# Patient Record
Sex: MALE | Race: WHITE | NOT HISPANIC OR LATINO | Employment: UNEMPLOYED | URBAN - NONMETROPOLITAN AREA
[De-identification: names, ages, dates, MRNs, and addresses within clinical notes are randomized per-mention and may not be internally consistent; named-entity substitution may affect disease eponyms.]

---

## 2017-08-02 ENCOUNTER — TRANSCRIBE ORDERS (OUTPATIENT)
Dept: CARDIOLOGY | Facility: CLINIC | Age: 72
End: 2017-08-02

## 2017-08-02 DIAGNOSIS — I49.9 CARDIAC ARRHYTHMIA, UNSPECIFIED CARDIAC ARRHYTHMIA TYPE: Primary | ICD-10-CM

## 2017-08-10 ENCOUNTER — CONSULT (OUTPATIENT)
Dept: CARDIOLOGY | Facility: CLINIC | Age: 72
End: 2017-08-10

## 2017-08-10 VITALS
BODY MASS INDEX: 26.81 KG/M2 | HEART RATE: 64 BPM | DIASTOLIC BLOOD PRESSURE: 68 MMHG | WEIGHT: 181 LBS | SYSTOLIC BLOOD PRESSURE: 150 MMHG | HEIGHT: 69 IN

## 2017-08-10 DIAGNOSIS — E78.00 HYPERCHOLESTEREMIA: ICD-10-CM

## 2017-08-10 DIAGNOSIS — I10 ESSENTIAL HYPERTENSION: Primary | ICD-10-CM

## 2017-08-10 DIAGNOSIS — R06.02 SHORTNESS OF BREATH: ICD-10-CM

## 2017-08-10 DIAGNOSIS — R01.1 MURMUR, CARDIAC: ICD-10-CM

## 2017-08-10 DIAGNOSIS — R07.9 CHEST PAIN, UNSPECIFIED TYPE: ICD-10-CM

## 2017-08-10 DIAGNOSIS — I49.1 PAC (PREMATURE ATRIAL CONTRACTION): ICD-10-CM

## 2017-08-10 DIAGNOSIS — R00.2 PALPITATIONS: ICD-10-CM

## 2017-08-10 PROCEDURE — 99204 OFFICE O/P NEW MOD 45 MIN: CPT | Performed by: INTERNAL MEDICINE

## 2017-08-10 PROCEDURE — 93000 ELECTROCARDIOGRAM COMPLETE: CPT | Performed by: INTERNAL MEDICINE

## 2017-08-10 RX ORDER — LEVOTHYROXINE SODIUM 0.05 MG/1
50 TABLET ORAL DAILY
COMMUNITY

## 2017-08-10 RX ORDER — COLCHICINE 0.6 MG/1
0.6 TABLET ORAL DAILY
COMMUNITY
End: 2020-01-01 | Stop reason: ALTCHOICE

## 2017-08-10 RX ORDER — CIPROFLOXACIN 500 MG/1
500 TABLET, FILM COATED ORAL 2 TIMES DAILY
COMMUNITY
End: 2017-11-14 | Stop reason: ALTCHOICE

## 2017-08-10 RX ORDER — ATORVASTATIN CALCIUM 10 MG/1
10 TABLET, FILM COATED ORAL NIGHTLY
COMMUNITY

## 2017-08-10 RX ORDER — ASPIRIN 81 MG/1
81 TABLET, CHEWABLE ORAL DAILY
COMMUNITY
End: 2017-11-14 | Stop reason: ALTCHOICE

## 2017-08-10 RX ORDER — PROPRANOLOL HYDROCHLORIDE 120 MG/1
120 CAPSULE, EXTENDED RELEASE ORAL DAILY
COMMUNITY

## 2017-08-10 NOTE — PROGRESS NOTES
CARDIAC COMPLAINTS  chest pressure/discomfort, dyspnea, fatigue and palpitations      Subjective   Salo Moctezuma is a 72 y.o. male came in today for his initial cardiac evaluation.  He has history of mild hypertension, hypercholesterolemia, hypothyroidism who has been having episodes of palpitation in the past.  He was put on long-acting beta blockers for a long time.  Recently he started noticing the palpitation is getting little bit more frequent.  When he was seen at your office, the EKG showed sinus rhythm with a few PAC's.  He is now referred for further evaluation.  He also has been having left-sided chest pain, which is mostly in the inframammary area.  It occurs anytime of the day, not related to any palpitation.  It is not precipitated or relieved by any activities.  It lasts only for a few seconds.  He also has been having shortness of breath which occurs mostly with activities for some time at rest also.  His lab work was done at your office recently, and he thinks that it was within normal limit.    History reviewed. No pertinent surgical history.    Current Outpatient Prescriptions   Medication Sig Dispense Refill   • aspirin 81 MG chewable tablet Chew 81 mg Daily.     • atorvastatin (LIPITOR) 10 MG tablet Take 10 mg by mouth Every Night.     • ciprofloxacin (CIPRO) 500 MG tablet Take 500 mg by mouth 2 (Two) Times a Day. For ten days     • colchicine 0.6 MG tablet Take 0.6 mg by mouth Daily.     • levothyroxine (SYNTHROID, LEVOTHROID) 50 MCG tablet Take 50 mcg by mouth Daily.     • propranolol LA (INDERAL LA) 120 MG 24 hr capsule Take 120 mg by mouth Daily.       No current facility-administered medications for this visit.            ALLERGIES:  Sulfa antibiotics    Past Medical History:   Diagnosis Date   • History of cholecystectomy    • Hyperlipidemia    • Hypertension    • Hypothyroidism    • Mitral valve prolapse        History   Smoking Status   • Never Smoker   Smokeless Tobacco   • Current  "User   • Types: Chew          Family History   Problem Relation Age of Onset   • Heart attack Father    • Heart disease Brother        Review of Systems   Constitution: Positive for malaise/fatigue. Negative for decreased appetite.   HENT: Negative for congestion and sore throat.    Eyes: Negative for blurred vision.   Cardiovascular: Positive for chest pain, dyspnea on exertion, irregular heartbeat and palpitations.   Respiratory: Positive for shortness of breath. Negative for snoring.    Endocrine: Negative for cold intolerance and heat intolerance.   Hematologic/Lymphatic: Negative for adenopathy. Does not bruise/bleed easily.   Skin: Negative for itching, nail changes and skin cancer.   Musculoskeletal: Positive for arthritis. Negative for myalgias.   Gastrointestinal: Negative for abdominal pain, dysphagia and heartburn.   Genitourinary: Negative for bladder incontinence and frequency.   Neurological: Negative for dizziness, light-headedness, seizures and vertigo.   Psychiatric/Behavioral: Negative for altered mental status.   Allergic/Immunologic: Negative for environmental allergies and hives.       Diabetes- No  Thyroid- abnormal    Objective     /68 (BP Location: Right arm)  Pulse 64  Ht 69\" (175.3 cm)  Wt 181 lb (82.1 kg)  BMI 26.73 kg/m2    Physical Exam   Constitutional: He is oriented to person, place, and time. He appears well-nourished.   HENT:   Head: Normocephalic.   Eyes: Pupils are equal, round, and reactive to light.   Neck: Normal range of motion.   Cardiovascular: Normal rate, regular rhythm, S1 normal and S2 normal.    Murmur heard.  Pulmonary/Chest: Breath sounds normal.   Abdominal: Soft. Bowel sounds are normal.   Musculoskeletal: Normal range of motion.   Neurological: He is alert and oriented to person, place, and time.   Skin: Skin is warm.   Psychiatric: He has a normal mood and affect.         ECG 12 Lead  Date/Time: 8/10/2017 12:05 PM  Performed by: OLVIN" COLBY  Authorized by: COLBY VILCHIS   Comparison: compared with previous ECG from 8/1/2017  Comparison to previous ECG: No PAC's  Rhythm: sinus rhythm  Rate: normal  QRS axis: normal  Clinical impression: non-specific ECG              Assessment/Plan     Salo was seen today for establish care, shortness of breath and palpitations.    Diagnoses and all orders for this visit:    Essential hypertension    Hypercholesteremia  -     Stress Test With Myocardial Perfusion One Day; Future    Palpitations  -     Cardiac Event Monitor; Future  -     ECG 12 Lead    Chest pain, unspecified type  -     Stress Test With Myocardial Perfusion One Day; Future  -     ECG 12 Lead    Shortness of breath  -     Adult Transthoracic Echo Complete; Future    Murmur, cardiac  -     Adult Transthoracic Echo Complete; Future    PAC (premature atrial contraction)  -     Cardiac Event Monitor; Future     His heart rate is stable and regular.  His blood pressure is mildly elevated.  His EKG showed sinus rhythm with normal OK interval and QTC.  He had some nonspecific ST changes.  His clinical examination shows evidence of mild mitral regurgitation.  BMI is upper limit of normal.  I explained to him about the EKG which was done at your office.  At this time, he is advised to talk you regarding the lab work.  If his renal function is normal, suggest a low dose of ace inhibitors or ARB.  He is also advised to talk you regarding his TSH level.  At this time I am going to put an extended monitor to evaluate the arrhythmia.  I scheduled him to undergo an echocardiogram to evaluate the LV function and the PA pressure.  He also need to undergo a stress test to evaluate any stress-induced arrhythmia and also to rule out stress-induced ischemia.  Based on the results of these tests, further recommendations will be made.               Electronically signed by Colby Vilchis MD August 10, 2017 12:06 PM

## 2017-08-16 ENCOUNTER — TELEPHONE (OUTPATIENT)
Dept: CARDIOLOGY | Facility: CLINIC | Age: 72
End: 2017-08-16

## 2017-08-16 DIAGNOSIS — R07.89 OTHER CHEST PAIN: Primary | ICD-10-CM

## 2017-08-16 NOTE — TELEPHONE ENCOUNTER
[employer] denied nuclear stress, wants changed to treadmill. If that's okay with you, will you put in the order? Thanks

## 2017-08-23 ENCOUNTER — OUTSIDE FACILITY SERVICE (OUTPATIENT)
Dept: CARDIOLOGY | Facility: CLINIC | Age: 72
End: 2017-08-23

## 2017-08-23 ENCOUNTER — HOSPITAL ENCOUNTER (OUTPATIENT)
Dept: CARDIOLOGY | Facility: HOSPITAL | Age: 72
Discharge: HOME | End: 2017-08-23
Attending: INTERNAL MEDICINE | Admitting: INTERNAL MEDICINE

## 2017-08-23 ENCOUNTER — HOSPITAL ENCOUNTER (OUTPATIENT)
Dept: CARDIOLOGY | Facility: HOSPITAL | Age: 72
Discharge: HOME | End: 2017-08-23
Attending: INTERNAL MEDICINE

## 2017-08-23 DIAGNOSIS — R07.89 OTHER CHEST PAIN: ICD-10-CM

## 2017-08-23 DIAGNOSIS — R06.02 SHORTNESS OF BREATH: ICD-10-CM

## 2017-08-23 DIAGNOSIS — R01.1 MURMUR, CARDIAC: ICD-10-CM

## 2017-08-23 LAB
MAXIMAL PREDICTED HEART RATE: 148 BPM
STRESS TARGET HR: 126 BPM

## 2017-08-23 PROCEDURE — 93306 TTE W/DOPPLER COMPLETE: CPT | Performed by: INTERNAL MEDICINE

## 2017-08-23 PROCEDURE — 93306 TTE W/DOPPLER COMPLETE: CPT

## 2017-08-23 PROCEDURE — 93018 CV STRESS TEST I&R ONLY: CPT | Performed by: INTERNAL MEDICINE

## 2017-08-23 PROCEDURE — 93017 CV STRESS TEST TRACING ONLY: CPT

## 2017-08-28 ENCOUNTER — TELEPHONE (OUTPATIENT)
Dept: CARDIOLOGY | Facility: CLINIC | Age: 72
End: 2017-08-28

## 2017-08-28 DIAGNOSIS — R07.89 OTHER CHEST PAIN: Primary | ICD-10-CM

## 2017-08-28 NOTE — TELEPHONE ENCOUNTER
I spoke with patient's wife, Eunice Moctezuma.  Aware of regular stress test results and recommendations.  (Inconclusive since patient failed to achieve THR)  6 minutes, 76% THR    Patient is willing to proceed with nuclear stress.  Can you please put in order?

## 2017-09-27 ENCOUNTER — OUTSIDE FACILITY SERVICE (OUTPATIENT)
Dept: CARDIOLOGY | Facility: CLINIC | Age: 72
End: 2017-09-27

## 2017-09-27 PROCEDURE — 93228 REMOTE 30 DAY ECG REV/REPORT: CPT | Performed by: INTERNAL MEDICINE

## 2017-11-14 ENCOUNTER — OFFICE VISIT (OUTPATIENT)
Dept: CARDIOLOGY | Facility: CLINIC | Age: 72
End: 2017-11-14

## 2017-11-14 VITALS
SYSTOLIC BLOOD PRESSURE: 124 MMHG | DIASTOLIC BLOOD PRESSURE: 80 MMHG | HEART RATE: 72 BPM | BODY MASS INDEX: 27.99 KG/M2 | HEIGHT: 69 IN | WEIGHT: 189 LBS

## 2017-11-14 DIAGNOSIS — R00.2 PALPITATIONS: ICD-10-CM

## 2017-11-14 DIAGNOSIS — E78.00 HYPERCHOLESTEREMIA: ICD-10-CM

## 2017-11-14 DIAGNOSIS — I10 ESSENTIAL HYPERTENSION: ICD-10-CM

## 2017-11-14 DIAGNOSIS — R53.83 OTHER FATIGUE: ICD-10-CM

## 2017-11-14 DIAGNOSIS — I49.1 PAC (PREMATURE ATRIAL CONTRACTION): ICD-10-CM

## 2017-11-14 DIAGNOSIS — R51.9 MORNING HEADACHE: ICD-10-CM

## 2017-11-14 DIAGNOSIS — R06.02 SHORTNESS OF BREATH: ICD-10-CM

## 2017-11-14 DIAGNOSIS — R07.89 OTHER CHEST PAIN: ICD-10-CM

## 2017-11-14 DIAGNOSIS — G47.8 AWAKENS FROM SLEEP AT NIGHT: Primary | ICD-10-CM

## 2017-11-14 DIAGNOSIS — Z72.0 CHEWING TOBACCO USE: ICD-10-CM

## 2017-11-14 PROCEDURE — 99214 OFFICE O/P EST MOD 30 MIN: CPT | Performed by: NURSE PRACTITIONER

## 2017-11-14 NOTE — PROGRESS NOTES
Chief Complaint   Patient presents with   • Follow-up     For cardiac management. Reports that he does occasionally have chest pains near the side of his chest. Reports he does occasionally have palpitations. Reports shortness of breath, but nothing worse than usual. Would like to go over holter monitor results that are in chart. Last lab work done about 3 months per PCP, not in chart.    • Med Refill     PCP does medication refills.        Subjective       Salo Moctezuma is a 72 y.o. male with a history of mild hypertension, hypercholesterolemia, hypothyroidism and episodes of palpitation in the past.  He was put on long-acting beta blockers many years ago. In August 2017, he was referred to the office due to palpitations becoming more frequent. EKG showed sinus rhythm with a few PAC's.  He also reported left sided chest pain, lasts only for a few seconds without associated symptoms and occurring randomly. A regular stress test was done but inconclusive due to not being able to reach target heart rate. PACs were noted. Echocardiogram was suggestive of diastolic dysfunction with normal ejection fraction and increased PA pressure. A cardiac monitor was then worn and baseline is sinus. PACs noted, no significant arrhythmia.   Today he comes to the office for a follow up visit. He continues to have same symptoms as noted. He also admits to symptoms suggestive of nocturnal hypoxia.     HPI         Cardiac History:    Past Surgical History:   Procedure Laterality Date   • ECHO - CONVERTED  08/23/2017    EF 60-65%, mild MR, RVSP 41 mmHg   • EXERCISE STRESS - CONVERTED  08/23/2017    6 min, 76% THR, 172/75, frequent PACs, inconclusive    • OTHER SURGICAL HISTORY  09/10/2017    baseline sinus, PACs noted, brief atrial run, lowest HR 50 highest 112, no V-tach, no pauses       Current Outpatient Prescriptions   Medication Sig Dispense Refill   • atorvastatin (LIPITOR) 10 MG tablet Take 10 mg by mouth Every Night.     •  colchicine 0.6 MG tablet Take 0.6 mg by mouth Daily.     • levothyroxine (SYNTHROID, LEVOTHROID) 50 MCG tablet Take 50 mcg by mouth Daily.     • propranolol LA (INDERAL LA) 120 MG 24 hr capsule Take 120 mg by mouth Daily.       No current facility-administered medications for this visit.        Sulfa antibiotics    Past Medical History:   Diagnosis Date   • History of cholecystectomy    • Hyperlipidemia    • Hypertension    • Hypothyroidism    • Mitral valve prolapse        Social History     Social History   • Marital status:      Spouse name: N/A   • Number of children: N/A   • Years of education: N/A     Occupational History   • Not on file.     Social History Main Topics   • Smoking status: Never Smoker   • Smokeless tobacco: Current User     Types: Chew   • Alcohol use Yes      Comment: used to drink beer and wine but no in the past 2 years   • Drug use: No   • Sexual activity: Not on file     Other Topics Concern   • Not on file     Social History Narrative       Family History   Problem Relation Age of Onset   • Heart attack Father    • Heart disease Brother        Review of Systems   Constitution: Negative for decreased appetite and malaise/fatigue.   HENT: Negative for congestion and sore throat.    Eyes: Negative for blurred vision.   Cardiovascular: Positive for chest pain, irregular heartbeat and palpitations (flutters). Negative for leg swelling.   Respiratory: Positive for shortness of breath and sleep disturbances due to breathing.    Endocrine: Negative for cold intolerance and heat intolerance.   Hematologic/Lymphatic: Negative for bleeding problem. Does not bruise/bleed easily.   Skin: Negative for itching and nail changes.   Musculoskeletal: Positive for arthritis. Negative for falls and myalgias.   Gastrointestinal: Positive for nausea (sometimes after meal). Negative for abdominal pain, dysphagia, heartburn and melena.   Genitourinary: Negative for dysuria, frequency and hematuria.  "  Neurological: Positive for excessive daytime sleepiness and headaches. Negative for dizziness, light-headedness, seizures and vertigo.   Psychiatric/Behavioral: Negative for altered mental status. The patient has insomnia (wake up often). The patient is not nervous/anxious.    Allergic/Immunologic: Negative for environmental allergies and hives.      Diabetes- No  Thyroid-normal    Objective     /80 (BP Location: Right arm)  Pulse 72  Ht 69\" (175.3 cm)  Wt 189 lb (85.7 kg)  BMI 27.91 kg/m2    Physical Exam   Constitutional: He is oriented to person, place, and time. He appears well-nourished.   HENT:   Head: Normocephalic.   Eyes: Conjunctivae are normal. Pupils are equal, round, and reactive to light.   Neck: Normal range of motion. Neck supple. No JVD present. Carotid bruit is not present.   Cardiovascular: Normal rate, regular rhythm, S1 normal, S2 normal and normal pulses.    No murmur heard.  Pulmonary/Chest: Effort normal and breath sounds normal. He has no wheezes. He has no rales.   Abdominal: Soft. Bowel sounds are normal. He exhibits no distension. There is no tenderness.   Musculoskeletal: Normal range of motion. He exhibits no edema.   Neurological: He is alert and oriented to person, place, and time.   Skin: Skin is warm and dry.   Psychiatric: He has a normal mood and affect. His behavior is normal. Judgment and thought content normal.    Procedures        Assessment/Plan      Salo was seen today for follow-up and med refill.    Diagnoses and all orders for this visit:    Awakens from sleep at night  -     Overnight Sleep Oximetry Study; Future    Morning headache  -     Overnight Sleep Oximetry Study; Future    Essential hypertension  -     Overnight Sleep Oximetry Study; Future  -     Stress Test With Myocardial Perfusion One Day; Future    Hypercholesteremia  -     Stress Test With Myocardial Perfusion One Day; Future    PAC (premature atrial contraction)    Palpitations  -     Stress " Test With Myocardial Perfusion One Day; Future    Other fatigue  -     Overnight Sleep Oximetry Study; Future  -     Stress Test With Myocardial Perfusion One Day; Future    Other chest pain  -     Stress Test With Myocardial Perfusion One Day; Future    Shortness of breath  -     Overnight Sleep Oximetry Study; Future  -     Stress Test With Myocardial Perfusion One Day; Future    Chewing tobacco use      The reports of his recent regular stress, echo and cardiac monitor were reviewed. Stress was inconclusive due to not reaching THR. Due to persistent symptoms and PACs a nuclear stress test ordered. He also has symptoms suggestive of nocturnal hypoxia. An overnight oxygen monitor ordered. No medication changes made at this time. HIs BMI is 28. I encouraged him on cardiac diet and weight loss for goal BMI of 25. I also encouraged him to decrease use of chewing tobacco with goal of cessation. He follows with you for management of labs. Further recommendations based on test results.              Electronically signed by JULIAN Acosta,  November 14, 2017 4:09 PM

## 2017-11-17 ENCOUNTER — TELEPHONE (OUTPATIENT)
Dept: CARDIOLOGY | Facility: CLINIC | Age: 72
End: 2017-11-17

## 2017-11-17 NOTE — TELEPHONE ENCOUNTER
[employer] denied nuclear stress. Wants changed to regular treadmill. If that's okay with you, will you put in the order? Thanks!

## 2017-11-20 NOTE — TELEPHONE ENCOUNTER
I spoke with [employer] and tried to do appeal.  Peer to Peer has be JULIAN or MD.    I scheduled peer to peer for today 11/20/17 at 4:15 pm.  Medical review doctor will be calling for JULIAN Garcia.

## 2017-11-20 NOTE — TELEPHONE ENCOUNTER
He just done regular stress that was inconclusive due to inability to walk treadmill long enough to reach target heart rate. He continues to have palpitations. Need to evaluate for ischemic cause of symptoms. Thanks.

## 2017-11-21 NOTE — TELEPHONE ENCOUNTER
[facility] never called to do peer to peer yesterday.    I rescheduled to 11/22/17 11:45 am.  No afternoon slots were available.

## 2017-11-30 ENCOUNTER — HOSPITAL ENCOUNTER (OUTPATIENT)
Dept: CARDIOLOGY | Facility: HOSPITAL | Age: 72
Discharge: HOME | End: 2017-11-30

## 2017-11-30 ENCOUNTER — OUTSIDE FACILITY SERVICE (OUTPATIENT)
Dept: CARDIOLOGY | Facility: CLINIC | Age: 72
End: 2017-11-30

## 2017-11-30 DIAGNOSIS — R00.2 PALPITATIONS: ICD-10-CM

## 2017-11-30 DIAGNOSIS — E78.00 HYPERCHOLESTEREMIA: ICD-10-CM

## 2017-11-30 DIAGNOSIS — R53.83 OTHER FATIGUE: ICD-10-CM

## 2017-11-30 DIAGNOSIS — I10 ESSENTIAL HYPERTENSION: ICD-10-CM

## 2017-11-30 DIAGNOSIS — R07.89 OTHER CHEST PAIN: ICD-10-CM

## 2017-11-30 DIAGNOSIS — R06.02 SHORTNESS OF BREATH: ICD-10-CM

## 2017-11-30 LAB
MAXIMAL PREDICTED HEART RATE: 148 BPM
STRESS TARGET HR: 126 BPM

## 2017-11-30 PROCEDURE — 78452 HT MUSCLE IMAGE SPECT MULT: CPT

## 2017-11-30 PROCEDURE — 93017 CV STRESS TEST TRACING ONLY: CPT

## 2017-11-30 PROCEDURE — 78452 HT MUSCLE IMAGE SPECT MULT: CPT | Performed by: INTERNAL MEDICINE

## 2017-11-30 PROCEDURE — 93018 CV STRESS TEST I&R ONLY: CPT | Performed by: INTERNAL MEDICINE

## 2017-11-30 PROCEDURE — A9500 TC99M SESTAMIBI: HCPCS | Performed by: INTERNAL MEDICINE

## 2017-11-30 PROCEDURE — 25010000002 REGADENOSON 0.4 MG/5ML SOLUTION: Performed by: INTERNAL MEDICINE

## 2017-11-30 PROCEDURE — 0 TECHNETIUM SESTAMIBI: Performed by: INTERNAL MEDICINE

## 2017-11-30 RX ADMIN — TECHNETIUM TC-99M SESTAMIBI 1 DOSE: 1 INJECTION INTRAVENOUS at 13:15

## 2017-11-30 RX ADMIN — REGADENOSON 0.4 MG: 0.08 INJECTION, SOLUTION INTRAVENOUS at 13:15

## 2017-12-04 ENCOUNTER — TELEPHONE (OUTPATIENT)
Dept: CARDIOLOGY | Facility: CLINIC | Age: 72
End: 2017-12-04

## 2017-12-04 RX ORDER — ISOSORBIDE MONONITRATE 30 MG/1
30 TABLET, EXTENDED RELEASE ORAL DAILY
Qty: 30 TABLET | Refills: 7 | Status: SHIPPED | OUTPATIENT
Start: 2017-12-04 | End: 2018-05-15 | Stop reason: SDUPTHER

## 2017-12-04 NOTE — TELEPHONE ENCOUNTER
Patient aware of stress test results and recommendations.  Add Imdur 30 mg once a day.  Patient aware if symptoms persist to call the office.

## 2018-05-15 ENCOUNTER — OFFICE VISIT (OUTPATIENT)
Dept: CARDIOLOGY | Facility: CLINIC | Age: 73
End: 2018-05-15

## 2018-05-15 VITALS
HEART RATE: 72 BPM | WEIGHT: 191 LBS | HEIGHT: 69 IN | SYSTOLIC BLOOD PRESSURE: 120 MMHG | DIASTOLIC BLOOD PRESSURE: 80 MMHG | BODY MASS INDEX: 28.29 KG/M2

## 2018-05-15 DIAGNOSIS — E78.00 HYPERCHOLESTEREMIA: ICD-10-CM

## 2018-05-15 DIAGNOSIS — I10 ESSENTIAL HYPERTENSION: ICD-10-CM

## 2018-05-15 DIAGNOSIS — R00.2 PALPITATIONS: Primary | ICD-10-CM

## 2018-05-15 DIAGNOSIS — R07.89 OTHER CHEST PAIN: ICD-10-CM

## 2018-05-15 DIAGNOSIS — E66.3 OVERWEIGHT: ICD-10-CM

## 2018-05-15 DIAGNOSIS — R06.02 SHORTNESS OF BREATH: ICD-10-CM

## 2018-05-15 PROCEDURE — 99213 OFFICE O/P EST LOW 20 MIN: CPT | Performed by: NURSE PRACTITIONER

## 2018-05-15 RX ORDER — ISOSORBIDE MONONITRATE 30 MG/1
30 TABLET, EXTENDED RELEASE ORAL DAILY
Qty: 30 TABLET | Refills: 11 | Status: SHIPPED | OUTPATIENT
Start: 2018-05-15 | End: 2019-01-01 | Stop reason: SDUPTHER

## 2018-05-15 NOTE — PATIENT INSTRUCTIONS
Coronary Angiogram  A coronary angiogram is an X-ray procedure that is used to examine the arteries in the heart. In this procedure, a dye (contrast dye) is injected through a long, thin tube (catheter). The catheter is inserted through the groin, wrist, or arm. The dye is injected into each artery, then X-rays are taken to show if there is a blockage in the arteries of the heart. This procedure can also show if you have valve disease or a disease of the aorta, and it can be used to check the overall function of your heart muscle. You may have a coronary angiogram if:  · You are having chest pain, or other symptoms of angina, and you are at risk for heart disease.  · You have an abnormal electrocardiogram (ECG) or stress test.  · You have chest pain and heart failure.  · You are having irregular heart rhythms.  · You and your health care provider determine that the benefits of the test information outweigh the risks of the procedure.  Let your health care provider know about:  · Any allergies you have, including allergies to contrast dye.  · All medicines you are taking, including vitamins, herbs, eye drops, creams, and over-the-counter medicines.  · Any problems you or family members have had with anesthetic medicines.  · Any blood disorders you have.  · Any surgeries you have had.  · History of kidney problems or kidney failure.  · Any medical conditions you have.  · Whether you are pregnant or may be pregnant.  What are the risks?  Generally, this is a safe procedure. However, problems may occur, including:  · Infection.  · Allergic reaction to medicines or dyes that are used.  · Bleeding from the access site or other locations.  · Kidney injury, especially in people with impaired kidney function.  · Stroke (rare).  · Heart attack (rare).  · Damage to other structures or organs.  What happens before the procedure?  Staying hydrated   Follow instructions from your health care provider about hydration, which may  include:  · Up to 2 hours before the procedure - you may continue to drink clear liquids, such as water, clear fruit juice, black coffee, and plain tea.  Eating and drinking restrictions   Follow instructions from your health care provider about eating and drinking, which may include:  · 8 hours before the procedure - stop eating heavy meals or foods such as meat, fried foods, or fatty foods.  · 6 hours before the procedure - stop eating light meals or foods, such as toast or cereal.  · 2 hours before the procedure - stop drinking clear liquids.  General instructions   · Ask your health care provider about:  ¨ Changing or stopping your regular medicines. This is especially important if you are taking diabetes medicines or blood thinners.  ¨ Taking medicines such as ibuprofen. These medicines can thin your blood. Do not take these medicines before your procedure if your health care provider instructs you not to, though aspirin may be recommended prior to coronary angiograms.  · Plan to have someone take you home from the hospital or clinic.  · You may need to have blood tests or X-rays done.  What happens during the procedure?  · An IV tube will be inserted into one of your veins.  · You will be given one or more of the following:  ¨ A medicine to help you relax (sedative).  ¨ A medicine to numb the area where the catheter will be inserted into an artery (local anesthetic).  · To reduce your risk of infection:  ¨ Your health care team will wash or sanitize their hands.  ¨ Your skin will be washed with soap.  ¨ Hair may be removed from the area where the catheter will be inserted.  · You will be connected to a continuous ECG monitor.  · The catheter will be inserted into an artery. The location may be in your groin, in your wrist, or in the fold of your arm (near your elbow).  · A type of X-ray (fluoroscopy) will be used to help guide the catheter to the opening of the blood vessel that is being examined.  · A dye  will be injected into the catheter, and X-rays will be taken. The dye will help to show where any narrowing or blockages are located in the heart arteries.  · Tell your health care provider if you have any chest pain or trouble breathing during the procedure.  · If blockages are found, your health care provider may perform another procedure, such as inserting a coronary stent.  The procedure may vary among health care providers and hospitals.  What happens after the procedure?  · After the procedure, you will need to keep the area still for a few hours, or for as long as told by your health care provider. If the procedure is done through the groin, you will be instructed to not bend and not cross your legs.  · The insertion site will be checked frequently.  · The pulse in your foot or wrist will be checked frequently.  · You may have additional blood tests, X-rays, and a test that records the electrical activity of your heart (ECG).  · Do not drive for 24 hours if you were given a sedative.  Summary  · A coronary angiogram is an X-ray procedure that is used to look into the arteries in the heart.  · During the procedure, a dye (contrast dye) is injected through a long, thin tube (catheter). The catheter is inserted through the groin, wrist, or arm.  · Tell your health care provider about any allergies you have, including allergies to contrast dye.  · After the procedure, you will need to keep the area still for a few hours, or for as long as told by your health care provider.  This information is not intended to replace advice given to you by your health care provider. Make sure you discuss any questions you have with your health care provider.  Document Released: 06/23/2004 Document Revised: 09/29/2017 Document Reviewed: 09/29/2017  [employer] Interactive Patient Education © 2017 [employer]

## 2018-05-15 NOTE — PROGRESS NOTES
Chief Complaint   Patient presents with   • Follow-up     For cardiac management. Reports he has been having some chest pains that are sometimes sharp. Reports he occasionally has shortness of breath. Reports he does occasionally have palpitations. Last lab work was done a couple of months ago per PCP, not in chart.    • Med Refill     Needs refills on isosorbide. 90 day supplys to [facility].        Cardiac Complaints  chest pressure/discomfort, dyspnea and palpitations      Subjective   Salo Moctezuma is a 73 y.o. male with hypertension, hypercholesterolemia, hypothyroidism, and episodes of palpitation in the past.  He was put on long-acting beta blockers many years ago. In August 2017, he was referred to the office due to palpitations which were becoming more frequent. EKG showed sinus rhythm with a few PAC's.  He also reported left sided chest pain, lasts only for a few seconds without associated symptoms and occurring randomly. A regular stress test was done but inconclusive due to not being able to reach target heart rate. PACs were noted. Echocardiogram was suggestive of diastolic dysfunction with normal ejection fraction and increased PA pressure. A cardiac monitor was then worn and baseline is sinus. PACs noted, no significant arrhythmia. At last follow up he continued to have issues with palpitations and chest pressure, nuclear stress testing along with overnight oximetry advised.  Nuclear stress showed small inferolateral ischemia and imdur was added.      Cardiac History  Past Surgical History:   Procedure Laterality Date   • CARDIOVASCULAR STRESS TEST  11/30/2017    L. Myoview- Small Inferolateral Ischemia   • ECHO - CONVERTED  08/23/2017    EF 60-65%, mild MR, RVSP 41 mmHg   • EXERCISE STRESS - CONVERTED  08/23/2017    R.Stress- 6 min, 76% THR, 172/75, frequent PACs, inconclusive    • OTHER SURGICAL HISTORY  09/10/2017    Event Monitor- baseline sinus,brief atrial run, lowest HR 50 highest 112,         Current Outpatient Prescriptions   Medication Sig Dispense Refill   • atorvastatin (LIPITOR) 10 MG tablet Take 10 mg by mouth Every Night.     • colchicine 0.6 MG tablet Take 0.6 mg by mouth Daily.     • isosorbide mononitrate (IMDUR) 30 MG 24 hr tablet Take 1 tablet by mouth Daily. 30 tablet 11   • levothyroxine (SYNTHROID, LEVOTHROID) 50 MCG tablet Take 50 mcg by mouth Daily.     • propranolol LA (INDERAL LA) 120 MG 24 hr capsule Take 120 mg by mouth Daily.       No current facility-administered medications for this visit.        Sulfa antibiotics    Past Medical History:   Diagnosis Date   • History of cholecystectomy    • Hyperlipidemia    • Hypertension    • Hypothyroidism    • Mitral valve prolapse        Social History     Social History   • Marital status:      Spouse name: N/A   • Number of children: N/A   • Years of education: N/A     Occupational History   • Not on file.     Social History Main Topics   • Smoking status: Never Smoker   • Smokeless tobacco: Current User     Types: Chew   • Alcohol use Yes      Comment: used to drink beer and wine but no in the past 2 years   • Drug use: No   • Sexual activity: Not on file     Other Topics Concern   • Not on file     Social History Narrative   • No narrative on file       Family History   Problem Relation Age of Onset   • Heart attack Father    • Heart disease Brother        Review of Systems   Constitution: Negative for weakness and malaise/fatigue.   Cardiovascular: Positive for chest pain, dyspnea on exertion and palpitations. Negative for irregular heartbeat, leg swelling, near-syncope, orthopnea and syncope.   Respiratory: Positive for shortness of breath. Negative for cough and wheezing.    Musculoskeletal: Negative for back pain, joint pain and neck pain.   Gastrointestinal: Negative for anorexia, heartburn and nausea.   Genitourinary: Negative for dysuria, hesitancy and nocturia.   Neurological: Negative for dizziness, light-headedness  "and loss of balance.   Psychiatric/Behavioral: Negative for depression and memory loss. The patient is not nervous/anxious.            Objective     /80 (BP Location: Right arm)   Pulse 72   Ht 175.3 cm (69.02\")   Wt 86.6 kg (191 lb)   BMI 28.19 kg/m²     Physical Exam   Constitutional: He is oriented to person, place, and time. He appears well-developed and well-nourished.   HENT:   Head: Normocephalic and atraumatic.   Eyes: EOM are normal. Pupils are equal, round, and reactive to light.   Neck: Normal range of motion. Neck supple.   Cardiovascular: Normal rate and regular rhythm.    Murmur heard.  Pulmonary/Chest: Effort normal and breath sounds normal.   Abdominal: Soft.   Musculoskeletal: Normal range of motion.   Neurological: He is alert and oriented to person, place, and time.   Skin: Skin is warm and dry.   Psychiatric: He has a normal mood and affect. His behavior is normal.       Procedures    Assessment/Plan     HR and BP are both stable today.  HTN well managed on current.  No adjustment to current regimen advised.  Lipids remain managed with lipitor therapy.  Labs he reports are done with your office, could we have most recent for our review?  Patient continues to have symptoms indicative of sleep apnea, but no overnight oximetry was received after last visit. Test will be reordered with instructions given to patient about how to receive equipment for testing.  More recommendations to follow.  If symptoms of sharp chest pain, palpitations, and shortness of breath persist after testing patient has agreed to proceed with cardiac cath.  For now, he would like to wait to see what overnight oximetry shows.  Refills of imdur sent per request.  BMI elevated at 28, good cardiac diet with limited caloric intake, carbs, and starches recommended.  6 month follow up advised or sooner if needed.       Problems Addressed this Visit        Cardiovascular and Mediastinum    Essential hypertension    Relevant " Orders    Overnight Sleep Oximetry Study    Hypercholesteremia    Palpitations - Primary    Relevant Orders    Overnight Sleep Oximetry Study       Respiratory    Shortness of breath       Nervous and Auditory    Chest pain      Other Visit Diagnoses     Overweight              Patient's Body mass index is 28.19 kg/m². BMI is above normal parameters. Recommendations include: nutrition counseling.        Electronically signed by JULIAN Machado May 15, 2018 2:37 PM

## 2018-06-04 ENCOUNTER — TELEPHONE (OUTPATIENT)
Dept: CARDIOLOGY | Facility: CLINIC | Age: 73
End: 2018-06-04

## 2018-06-04 NOTE — TELEPHONE ENCOUNTER
Patient's wife called and said that [facility] in [place] said that they could not do order for overnight oximetry. She was asked if sending to [facility] in [place] would be ok, she said it would be. Order sent to [facility] in [place].

## 2019-01-01 ENCOUNTER — OFFICE VISIT (OUTPATIENT)
Dept: CARDIOLOGY | Facility: CLINIC | Age: 74
End: 2019-01-01

## 2019-01-01 VITALS
HEIGHT: 69 IN | WEIGHT: 173 LBS | HEART RATE: 68 BPM | DIASTOLIC BLOOD PRESSURE: 70 MMHG | SYSTOLIC BLOOD PRESSURE: 132 MMHG | BODY MASS INDEX: 25.62 KG/M2

## 2019-01-01 DIAGNOSIS — F43.9 STRESS AT HOME: ICD-10-CM

## 2019-01-01 DIAGNOSIS — R63.4 WEIGHT LOSS: ICD-10-CM

## 2019-01-01 DIAGNOSIS — R06.02 SHORTNESS OF BREATH: Primary | ICD-10-CM

## 2019-01-01 DIAGNOSIS — I10 ESSENTIAL HYPERTENSION: ICD-10-CM

## 2019-01-01 DIAGNOSIS — R01.1 HEART MURMUR: ICD-10-CM

## 2019-01-01 DIAGNOSIS — E78.00 HYPERCHOLESTEREMIA: ICD-10-CM

## 2019-01-01 DIAGNOSIS — I49.1 PAC (PREMATURE ATRIAL CONTRACTION): ICD-10-CM

## 2019-01-01 DIAGNOSIS — R40.0 HAS DAYTIME DROWSINESS: ICD-10-CM

## 2019-01-01 DIAGNOSIS — G47.34 NOCTURNAL OXYGEN DESATURATION: ICD-10-CM

## 2019-01-01 DIAGNOSIS — R07.89 OTHER CHEST PAIN: ICD-10-CM

## 2019-01-01 DIAGNOSIS — R94.39 ABNORMAL STRESS TEST: ICD-10-CM

## 2019-01-01 DIAGNOSIS — E66.3 OVERWEIGHT: ICD-10-CM

## 2019-01-01 PROCEDURE — 99214 OFFICE O/P EST MOD 30 MIN: CPT | Performed by: NURSE PRACTITIONER

## 2019-01-01 RX ORDER — ISOSORBIDE MONONITRATE 30 MG/1
TABLET, EXTENDED RELEASE ORAL
Qty: 30 TABLET | Refills: 0 | OUTPATIENT
Start: 2019-01-01

## 2019-01-01 RX ORDER — ISOSORBIDE MONONITRATE 30 MG/1
TABLET, EXTENDED RELEASE ORAL
Qty: 30 TABLET | Refills: 0 | Status: SHIPPED | OUTPATIENT
Start: 2019-01-01 | End: 2019-01-01 | Stop reason: SDUPTHER

## 2019-01-01 RX ORDER — ISOSORBIDE MONONITRATE 30 MG/1
30 TABLET, EXTENDED RELEASE ORAL DAILY
Qty: 30 TABLET | Refills: 0 | Status: SHIPPED | OUTPATIENT
Start: 2019-01-01 | End: 2019-01-01 | Stop reason: SDUPTHER

## 2019-01-01 RX ORDER — ISOSORBIDE MONONITRATE 30 MG/1
30 TABLET, EXTENDED RELEASE ORAL DAILY
Qty: 30 TABLET | Refills: 9 | Status: SHIPPED | OUTPATIENT
Start: 2019-01-01 | End: 2020-01-01 | Stop reason: SDUPTHER

## 2019-07-25 NOTE — PROGRESS NOTES
Chief Complaint   Patient presents with   • Follow-up     For cardiac management. Patient is not on aspirin. Has not had lab work done in a while, but states he needs to see PCP and will have done.    • Med Refill     Needs refills on Imdur. 90 day supplies to [facility].        Cardiac Complaints  dyspnea and fatigue      Subjective   Salo Moctezuma is a 74 y.o. male with hypertension, hypercholesterolemia, hypothyroidism, PACs, nocturnal desaturation, and episodes of palpitation in the past.  He was put on long-acting beta blockers many years ago. In August 2017, he was referred to the office due to palpitations which were becoming more frequent. EKG showed sinus rhythm with a few PAC's.  He also reported left sided chest pain, lasts only for a few seconds without associated symptoms and occurring randomly. A regular stress test was done but inconclusive due to not being able to reach target heart rate. PACs were noted. Echocardiogram was suggestive of diastolic dysfunction with normal ejection fraction and increased PA pressure. A cardiac monitor was then worn and baseline is sinus. PACs noted, no significant arrhythmia. Patient continued to have issues with palpitations and chest pressure and nuclear stress testing was advised.  Nuclear stress test showed small inferolateral ischemia and imdur was added. Overnight oximetry was recommended at last visit for daytime fatigue and palpitations.  It was markedly abnormal and patient and PCP notified in regards.     Patient returns today for follow up and denies any new concerns.  Chest pain he reports has been very well managed with imdur and states this has helped with palpitations as well. He does continue to have daytime drowsiness and shortness of breath but denies it is any worse than before. When questioned, patient denies following with pulmonary and does not report current oxygen therapy.  Last overnight oximetry showed low SPO2 of 67% and minutes below  88% at over 200 minutes.  Patient denies any recent labs but reports he will be following soon with PCP in regards. Refills of imdur requested.  He has quit his ASA therapy but denies any side effects or concerns in regards.  Patient does admit to more stress in his life as he lost his daughter in November and both he and his wife has had a difficult time dealing with this.                  Cardiac History  Past Surgical History:   Procedure Laterality Date   • CARDIOVASCULAR STRESS TEST  11/30/2017    L. Myoview- Small Inferolateral Ischemia   • ECHO - CONVERTED  08/23/2017    EF 60-65%, mild MR, RVSP 41 mmHg   • EXERCISE STRESS - CONVERTED  08/23/2017    R.Stress- 6 min, 76% THR, 172/75, frequent PACs, inconclusive    • OTHER SURGICAL HISTORY  09/10/2017    Event Monitor- baseline sinus,brief atrial run, lowest HR 50 highest 112,        Current Outpatient Medications   Medication Sig Dispense Refill   • atorvastatin (LIPITOR) 10 MG tablet Take 10 mg by mouth Every Night.     • colchicine 0.6 MG tablet Take 0.6 mg by mouth Daily.     • isosorbide mononitrate (IMDUR) 30 MG 24 hr tablet Take 1 tablet by mouth Daily. 30 tablet 9   • levothyroxine (SYNTHROID, LEVOTHROID) 50 MCG tablet Take 50 mcg by mouth Daily.     • propranolol LA (INDERAL LA) 120 MG 24 hr capsule Take 120 mg by mouth Daily.       No current facility-administered medications for this visit.        Sulfa antibiotics    Past Medical History:   Diagnosis Date   • History of cholecystectomy    • Hyperlipidemia    • Hypertension    • Hypothyroidism    • Mitral valve prolapse        Social History     Socioeconomic History   • Marital status:      Spouse name: Not on file   • Number of children: Not on file   • Years of education: Not on file   • Highest education level: Not on file   Tobacco Use   • Smoking status: Never Smoker   • Smokeless tobacco: Current User     Types: Chew   Substance and Sexual Activity   • Alcohol use: Yes     Comment: used  "to drink beer and wine but no in the past 2 years   • Drug use: No       Family History   Problem Relation Age of Onset   • Heart attack Father    • Heart disease Brother        Review of Systems   Constitution: Positive for weakness and malaise/fatigue.   Cardiovascular: Positive for dyspnea on exertion and palpitations. Negative for chest pain, claudication, irregular heartbeat, leg swelling, near-syncope, orthopnea and syncope.   Respiratory: Positive for shortness of breath. Negative for cough and wheezing.    Musculoskeletal: Negative for back pain, joint pain and joint swelling.   Gastrointestinal: Negative for anorexia, flatus, heartburn, nausea and vomiting.   Genitourinary: Negative for dysuria, hematuria, hesitancy and nocturia.   Neurological: Positive for excessive daytime sleepiness. Negative for dizziness, light-headedness and loss of balance.   Psychiatric/Behavioral: Positive for depression. The patient is nervous/anxious.            Objective     /70 (BP Location: Right arm)   Pulse 68   Ht 175.3 cm (69.02\")   Wt 78.5 kg (173 lb)   BMI 25.54 kg/m²     Physical Exam   Constitutional: He is oriented to person, place, and time. He appears well-developed and well-nourished.   HENT:   Head: Normocephalic and atraumatic.   Eyes: EOM are normal. Pupils are equal, round, and reactive to light.   Neck: Normal range of motion. Neck supple.   Cardiovascular: Normal rate. A regularly irregular rhythm present.   Murmur heard.  Pulmonary/Chest: Effort normal and breath sounds normal.   Abdominal: Soft.   Musculoskeletal: Normal range of motion.   Neurological: He is alert and oriented to person, place, and time.   Skin: Skin is warm and dry.   Psychiatric: He has a normal mood and affect. His behavior is normal.       Procedures    Assessment/Plan     HR is regularly irregular today with early beat noted about every 7 beats, typical of PAC/PVC.  No adjustment to current beta blocker therapy recommended " as he denies any symptoms with the early beats and says he does not notice them.  He was urged to limit caffeine intake.   HTN well managed on current, same advised.  He has continued on imdur therapy for history of abnormal stress test and chest discomfort.  He tolerates well and chest pain is denied, same dosing advised.  It should be noted, his most recent overnight oximetry last summer was very abnormal with low SPO2 of 67% and time less than 89% equal to about 400 minutes or greater.  Repeat overnight oximetry recommended as patient is not on oxygen, has not seem pulmonary, and continues to have daytime fatigue and shortness of breath.  More recommendations to follow.  If remains positive, referral to pulmonary for sleep study will be made. He has continued on statin therapy for hyperlipidemia and reports no concerns but admits his FLP has not been checked for sometime.  For now, same regimen continued.Patient urged to follow with your office in regards as well as TSH for hypothyroidism and synthroid dosing.  Could we please have next copy for review? Patient urged to restart baby ASA daily as he has not been taking but is unsure why.  With a positive stress test, HTN, and hyperlipidemia, patient has multiple risk factors for CAD.  He will  OTC. No new cardiac workup recommended at this time as no new or worsening concerns are voiced.  BMI is noted around 25.54 with an over 15 pounds of weight loss. Patient denies trying to lose weight but states with stress it has just happened. Condolences given in regards to daughter's death. Please assess at next visit. 6 month follow up recommended or sooner if needed.           Problems Addressed this Visit        Cardiovascular and Mediastinum    Essential hypertension    Hypercholesteremia    PAC (premature atrial contraction)    Relevant Medications    isosorbide mononitrate (IMDUR) 30 MG 24 hr tablet       Respiratory    Shortness of breath - Primary     Relevant Orders    Overnight Sleep Oximetry Study       Nervous and Auditory    Chest pain      Other Visit Diagnoses     Abnormal stress test        Has daytime drowsiness        Relevant Orders    Overnight Sleep Oximetry Study    Heart murmur        Nocturnal oxygen desaturation        Overweight        Weight loss        Stress at home              Patient's Body mass index is 25.54 kg/m². BMI is above normal parameters. Recommendations include: nutrition counseling.            Electronically signed by JULIAN Machado July 26, 2019 10:35 AM

## 2019-07-31 NOTE — PROGRESS NOTES
Overnight much better than last.  Borderline positive as time less than 89% 16 minutes. Please advise we can refer to pulmonary or send to PCP to review.

## 2020-01-01 ENCOUNTER — TELEPHONE (OUTPATIENT)
Dept: ONCOLOGY | Facility: CLINIC | Age: 75
End: 2020-01-01

## 2020-01-01 ENCOUNTER — APPOINTMENT (OUTPATIENT)
Dept: ONCOLOGY | Facility: HOSPITAL | Age: 75
End: 2020-01-01

## 2020-01-01 ENCOUNTER — TELEPHONE (OUTPATIENT)
Dept: ONCOLOGY | Facility: HOSPITAL | Age: 75
End: 2020-01-01

## 2020-01-01 ENCOUNTER — CONSULT (OUTPATIENT)
Dept: ONCOLOGY | Facility: CLINIC | Age: 75
End: 2020-01-01

## 2020-01-01 ENCOUNTER — APPOINTMENT (OUTPATIENT)
Dept: GENERAL RADIOLOGY | Facility: HOSPITAL | Age: 75
End: 2020-01-01

## 2020-01-01 ENCOUNTER — OFFICE VISIT (OUTPATIENT)
Dept: CARDIOLOGY | Facility: CLINIC | Age: 75
End: 2020-01-01

## 2020-01-01 ENCOUNTER — INFUSION (OUTPATIENT)
Dept: ONCOLOGY | Facility: HOSPITAL | Age: 75
End: 2020-01-01

## 2020-01-01 ENCOUNTER — TELEPHONE (OUTPATIENT)
Dept: CARDIOLOGY | Facility: CLINIC | Age: 75
End: 2020-01-01

## 2020-01-01 ENCOUNTER — LAB (OUTPATIENT)
Dept: ONCOLOGY | Facility: CLINIC | Age: 75
End: 2020-01-01

## 2020-01-01 ENCOUNTER — HOSPITAL ENCOUNTER (OUTPATIENT)
Dept: CARDIOLOGY | Facility: HOSPITAL | Age: 75
Discharge: HOME | End: 2020-03-12
Admitting: INTERNAL MEDICINE

## 2020-01-01 ENCOUNTER — DOCUMENTATION (OUTPATIENT)
Dept: ONCOLOGY | Facility: HOSPITAL | Age: 75
End: 2020-01-01

## 2020-01-01 ENCOUNTER — OFFICE VISIT (OUTPATIENT)
Dept: ONCOLOGY | Facility: CLINIC | Age: 75
End: 2020-01-01

## 2020-01-01 ENCOUNTER — APPOINTMENT (OUTPATIENT)
Dept: CT IMAGING | Facility: HOSPITAL | Age: 75
End: 2020-01-01

## 2020-01-01 ENCOUNTER — HOSPITAL ENCOUNTER (OUTPATIENT)
Dept: MRI IMAGING | Facility: HOSPITAL | Age: 75
Discharge: HOME | End: 2020-03-13

## 2020-01-01 ENCOUNTER — HOSPITAL ENCOUNTER (OUTPATIENT)
Dept: PET IMAGING | Facility: HOSPITAL | Age: 75
Discharge: HOME | End: 2020-03-13
Admitting: INTERNAL MEDICINE

## 2020-01-01 ENCOUNTER — HOSPITAL ENCOUNTER (OUTPATIENT)
Facility: HOSPITAL | Age: 75
Setting detail: HOSPITAL OUTPATIENT SURGERY
Discharge: HOME | End: 2020-03-20
Attending: SURGERY | Admitting: SURGERY

## 2020-01-01 ENCOUNTER — ANESTHESIA (OUTPATIENT)
Dept: PERIOP | Facility: HOSPITAL | Age: 75
End: 2020-01-01

## 2020-01-01 ENCOUNTER — ANESTHESIA EVENT (OUTPATIENT)
Dept: PERIOP | Facility: HOSPITAL | Age: 75
End: 2020-01-01

## 2020-01-01 ENCOUNTER — HOSPITAL ENCOUNTER (EMERGENCY)
Facility: HOSPITAL | Age: 75
Discharge: ANOTHER HOSPITAL | End: 2020-04-22
Attending: EMERGENCY MEDICINE | Admitting: EMERGENCY MEDICINE

## 2020-01-01 ENCOUNTER — APPOINTMENT (OUTPATIENT)
Dept: PREADMISSION TESTING | Facility: HOSPITAL | Age: 75
End: 2020-01-01

## 2020-01-01 ENCOUNTER — OFFICE VISIT (OUTPATIENT)
Dept: SURGERY | Facility: CLINIC | Age: 75
End: 2020-01-01

## 2020-01-01 VITALS
DIASTOLIC BLOOD PRESSURE: 61 MMHG | RESPIRATION RATE: 18 BRPM | SYSTOLIC BLOOD PRESSURE: 119 MMHG | HEART RATE: 95 BPM | OXYGEN SATURATION: 99 % | TEMPERATURE: 97 F

## 2020-01-01 VITALS
DIASTOLIC BLOOD PRESSURE: 53 MMHG | TEMPERATURE: 98.2 F | WEIGHT: 182 LBS | OXYGEN SATURATION: 90 % | HEART RATE: 82 BPM | BODY MASS INDEX: 26.88 KG/M2 | RESPIRATION RATE: 18 BRPM | SYSTOLIC BLOOD PRESSURE: 110 MMHG

## 2020-01-01 VITALS
SYSTOLIC BLOOD PRESSURE: 140 MMHG | BODY MASS INDEX: 25.77 KG/M2 | RESPIRATION RATE: 18 BRPM | HEART RATE: 67 BPM | DIASTOLIC BLOOD PRESSURE: 70 MMHG | TEMPERATURE: 97.2 F | WEIGHT: 174.5 LBS | OXYGEN SATURATION: 98 %

## 2020-01-01 VITALS
DIASTOLIC BLOOD PRESSURE: 73 MMHG | RESPIRATION RATE: 18 BRPM | SYSTOLIC BLOOD PRESSURE: 136 MMHG | HEART RATE: 70 BPM | BODY MASS INDEX: 27.01 KG/M2 | WEIGHT: 183 LBS | TEMPERATURE: 97 F | OXYGEN SATURATION: 95 %

## 2020-01-01 VITALS
OXYGEN SATURATION: 99 % | HEIGHT: 69 IN | RESPIRATION RATE: 16 BRPM | WEIGHT: 178.2 LBS | BODY MASS INDEX: 26.39 KG/M2 | HEART RATE: 72 BPM | TEMPERATURE: 98.1 F | SYSTOLIC BLOOD PRESSURE: 120 MMHG | DIASTOLIC BLOOD PRESSURE: 65 MMHG

## 2020-01-01 VITALS
WEIGHT: 174.5 LBS | RESPIRATION RATE: 18 BRPM | OXYGEN SATURATION: 98 % | SYSTOLIC BLOOD PRESSURE: 111 MMHG | DIASTOLIC BLOOD PRESSURE: 56 MMHG | BODY MASS INDEX: 25.77 KG/M2 | HEART RATE: 73 BPM | TEMPERATURE: 97.2 F

## 2020-01-01 VITALS
SYSTOLIC BLOOD PRESSURE: 122 MMHG | WEIGHT: 181 LBS | DIASTOLIC BLOOD PRESSURE: 60 MMHG | HEIGHT: 69 IN | HEART RATE: 60 BPM | BODY MASS INDEX: 26.81 KG/M2

## 2020-01-01 VITALS
OXYGEN SATURATION: 97 % | SYSTOLIC BLOOD PRESSURE: 66 MMHG | HEART RATE: 65 BPM | TEMPERATURE: 98.9 F | WEIGHT: 173 LBS | RESPIRATION RATE: 22 BRPM | DIASTOLIC BLOOD PRESSURE: 39 MMHG | BODY MASS INDEX: 25.62 KG/M2 | HEIGHT: 69 IN

## 2020-01-01 VITALS
WEIGHT: 181.4 LBS | TEMPERATURE: 97.7 F | SYSTOLIC BLOOD PRESSURE: 154 MMHG | HEART RATE: 91 BPM | HEIGHT: 69 IN | DIASTOLIC BLOOD PRESSURE: 78 MMHG | RESPIRATION RATE: 18 BRPM | OXYGEN SATURATION: 98 % | BODY MASS INDEX: 26.87 KG/M2

## 2020-01-01 VITALS
RESPIRATION RATE: 18 BRPM | DIASTOLIC BLOOD PRESSURE: 60 MMHG | OXYGEN SATURATION: 93 % | SYSTOLIC BLOOD PRESSURE: 117 MMHG | TEMPERATURE: 97.8 F | HEART RATE: 84 BPM | WEIGHT: 232 LBS | BODY MASS INDEX: 34.26 KG/M2

## 2020-01-01 VITALS
DIASTOLIC BLOOD PRESSURE: 80 MMHG | SYSTOLIC BLOOD PRESSURE: 110 MMHG | WEIGHT: 183 LBS | HEIGHT: 69 IN | BODY MASS INDEX: 27.11 KG/M2 | TEMPERATURE: 98.5 F

## 2020-01-01 DIAGNOSIS — C83.39 DIFFUSE LARGE B-CELL LYMPHOMA OF EXTRANODAL SITE EXCLUDING SPLEEN AND OTHER SOLID ORGANS (HCC): Primary | ICD-10-CM

## 2020-01-01 DIAGNOSIS — C83.39 DIFFUSE LARGE B-CELL LYMPHOMA OF EXTRANODAL SITE EXCLUDING SPLEEN AND OTHER SOLID ORGANS (HCC): ICD-10-CM

## 2020-01-01 DIAGNOSIS — Z11.59 ENCOUNTER FOR SCREENING FOR OTHER VIRAL DISEASES: ICD-10-CM

## 2020-01-01 DIAGNOSIS — R94.39 ABNORMAL STRESS TEST: ICD-10-CM

## 2020-01-01 DIAGNOSIS — I49.1 PAC (PREMATURE ATRIAL CONTRACTION): ICD-10-CM

## 2020-01-01 DIAGNOSIS — R00.2 PALPITATIONS: ICD-10-CM

## 2020-01-01 DIAGNOSIS — Z51.11 ENCOUNTER FOR ANTINEOPLASTIC CHEMOTHERAPY: ICD-10-CM

## 2020-01-01 DIAGNOSIS — J18.9 PNEUMONIA OF BOTH LUNGS DUE TO INFECTIOUS ORGANISM, UNSPECIFIED PART OF LUNG: ICD-10-CM

## 2020-01-01 DIAGNOSIS — E78.00 HYPERCHOLESTEREMIA: ICD-10-CM

## 2020-01-01 DIAGNOSIS — I10 ESSENTIAL HYPERTENSION: ICD-10-CM

## 2020-01-01 DIAGNOSIS — R60.0 EDEMA LEG: Primary | ICD-10-CM

## 2020-01-01 DIAGNOSIS — J96.01 ACUTE RESPIRATORY FAILURE WITH HYPOXIA (HCC): Primary | ICD-10-CM

## 2020-01-01 LAB
A-A DO2: 433.8 MMHG (ref 0–300)
A-A DO2: 441.9 MMHG (ref 0–300)
ABO GROUP BLD: NORMAL
ALBUMIN SERPL-MCNC: 3.58 G/DL (ref 3.5–5.2)
ALBUMIN SERPL-MCNC: 3.59 G/DL (ref 3.5–5.2)
ALBUMIN SERPL-MCNC: 3.71 G/DL (ref 3.5–5.2)
ALBUMIN SERPL-MCNC: 3.95 G/DL (ref 3.5–5.2)
ALBUMIN/GLOB SERPL: 1.1 G/DL
ALBUMIN/GLOB SERPL: 1.2 G/DL
ALBUMIN/GLOB SERPL: 1.2 G/DL
ALBUMIN/GLOB SERPL: 1.3 G/DL
ALP SERPL-CCNC: 109 U/L (ref 39–117)
ALP SERPL-CCNC: 120 U/L (ref 39–117)
ALP SERPL-CCNC: 129 U/L (ref 39–117)
ALP SERPL-CCNC: 139 U/L (ref 39–117)
ALT SERPL W P-5'-P-CCNC: 19 U/L (ref 1–41)
ALT SERPL W P-5'-P-CCNC: 20 U/L (ref 1–41)
ALT SERPL W P-5'-P-CCNC: 22 U/L (ref 1–41)
ALT SERPL W P-5'-P-CCNC: 34 U/L (ref 1–41)
ANION GAP SERPL CALCULATED.3IONS-SCNC: 10.4 MMOL/L (ref 5–15)
ANION GAP SERPL CALCULATED.3IONS-SCNC: 10.9 MMOL/L (ref 5–15)
ANION GAP SERPL CALCULATED.3IONS-SCNC: 13.7 MMOL/L (ref 5–15)
ANION GAP SERPL CALCULATED.3IONS-SCNC: 13.8 MMOL/L (ref 5–15)
ANION GAP SERPL CALCULATED.3IONS-SCNC: 14.1 MMOL/L (ref 5–15)
APTT PPP: 24.2 SECONDS (ref 23.8–36.1)
ARTERIAL PATENCY WRIST A: ABNORMAL
ARTERIAL PATENCY WRIST A: ABNORMAL
AST SERPL-CCNC: 20 U/L (ref 1–40)
AST SERPL-CCNC: 20 U/L (ref 1–40)
AST SERPL-CCNC: 32 U/L (ref 1–40)
AST SERPL-CCNC: 33 U/L (ref 1–40)
ATMOSPHERIC PRESS: 729 MMHG
ATMOSPHERIC PRESS: 730 MMHG
BACTERIA SPEC AEROBE CULT: NORMAL
BACTERIA SPEC AEROBE CULT: NORMAL
BASE EXCESS BLDA CALC-SCNC: -0.4 MMOL/L (ref 0–2)
BASE EXCESS BLDA CALC-SCNC: 0.8 MMOL/L (ref 0–2)
BASOPHILS # BLD AUTO: 0.06 10*3/MM3 (ref 0–0.2)
BASOPHILS # BLD AUTO: 0.12 10*3/MM3 (ref 0–0.2)
BASOPHILS # BLD AUTO: 0.32 10*3/MM3 (ref 0–0.2)
BASOPHILS NFR BLD AUTO: 0.6 % (ref 0–1.5)
BASOPHILS NFR BLD AUTO: 1.3 % (ref 0–1.5)
BASOPHILS NFR BLD AUTO: 2.8 % (ref 0–1.5)
BDY SITE: ABNORMAL
BDY SITE: ABNORMAL
BH CV ECHO MEAS - % IVS THICK: 38.6 %
BH CV ECHO MEAS - % LVPW THICK: 60 %
BH CV ECHO MEAS - ACS: 1.1 CM
BH CV ECHO MEAS - AI DEC SLOPE: 172 CM/SEC^2
BH CV ECHO MEAS - AI MAX PG: 46.8 MMHG
BH CV ECHO MEAS - AI MAX VEL: 342 CM/SEC
BH CV ECHO MEAS - AI P1/2T: 582.4 MSEC
BH CV ECHO MEAS - AO MAX PG: 10.8 MMHG
BH CV ECHO MEAS - AO MEAN PG: 6 MMHG
BH CV ECHO MEAS - AO ROOT AREA (BSA CORRECTED): 1.5
BH CV ECHO MEAS - AO ROOT AREA: 7.1 CM^2
BH CV ECHO MEAS - AO ROOT DIAM: 3 CM
BH CV ECHO MEAS - AO V2 MAX: 164 CM/SEC
BH CV ECHO MEAS - AO V2 MEAN: 114 CM/SEC
BH CV ECHO MEAS - AO V2 VTI: 38.7 CM
BH CV ECHO MEAS - BSA(HAYCOCK): 2 M^2
BH CV ECHO MEAS - BSA: 2 M^2
BH CV ECHO MEAS - BZI_BMI: 26.7 KILOGRAMS/M^2
BH CV ECHO MEAS - BZI_METRIC_HEIGHT: 175.3 CM
BH CV ECHO MEAS - BZI_METRIC_WEIGHT: 82.1 KG
BH CV ECHO MEAS - EDV(CUBED): 153.1 ML
BH CV ECHO MEAS - EDV(MOD-SP4): 31.8 ML
BH CV ECHO MEAS - EDV(TEICH): 138.3 ML
BH CV ECHO MEAS - EF(CUBED): 75.8 %
BH CV ECHO MEAS - EF(MOD-SP4): 63.5 %
BH CV ECHO MEAS - EF(TEICH): 67.3 %
BH CV ECHO MEAS - ESV(CUBED): 37.1 ML
BH CV ECHO MEAS - ESV(MOD-SP4): 11.6 ML
BH CV ECHO MEAS - ESV(TEICH): 45.3 ML
BH CV ECHO MEAS - FS: 37.7 %
BH CV ECHO MEAS - IVS/LVPW: 0.78
BH CV ECHO MEAS - IVSD: 1.3 CM
BH CV ECHO MEAS - IVSS: 1.7 CM
BH CV ECHO MEAS - LA DIMENSION: 3.8 CM
BH CV ECHO MEAS - LA/AO: 1.3
BH CV ECHO MEAS - LV DIASTOLIC VOL/BSA (35-75): 16.1 ML/M^2
BH CV ECHO MEAS - LV MASS(C)D: 332.8 GRAMS
BH CV ECHO MEAS - LV MASS(C)DI: 168.1 GRAMS/M^2
BH CV ECHO MEAS - LV MASS(C)S: 340 GRAMS
BH CV ECHO MEAS - LV MASS(C)SI: 171.7 GRAMS/M^2
BH CV ECHO MEAS - LV SYSTOLIC VOL/BSA (12-30): 5.9 ML/M^2
BH CV ECHO MEAS - LVIDD: 5.4 CM
BH CV ECHO MEAS - LVIDS: 3.3 CM
BH CV ECHO MEAS - LVLD AP4: 7.1 CM
BH CV ECHO MEAS - LVLS AP4: 6.1 CM
BH CV ECHO MEAS - LVOT AREA (M): 2.3 CM^2
BH CV ECHO MEAS - LVOT AREA: 2.3 CM^2
BH CV ECHO MEAS - LVOT DIAM: 1.7 CM
BH CV ECHO MEAS - LVPWD: 1.6 CM
BH CV ECHO MEAS - LVPWS: 2.6 CM
BH CV ECHO MEAS - MV E MAX VEL: 115 CM/SEC
BH CV ECHO MEAS - PA ACC TIME: 0.08 SEC
BH CV ECHO MEAS - PA PR(ACCEL): 42.6 MMHG
BH CV ECHO MEAS - RAP SYSTOLE: 10 MMHG
BH CV ECHO MEAS - RVSP: 42.6 MMHG
BH CV ECHO MEAS - SI(AO): 138.2 ML/M^2
BH CV ECHO MEAS - SI(CUBED): 58.6 ML/M^2
BH CV ECHO MEAS - SI(MOD-SP4): 10.2 ML/M^2
BH CV ECHO MEAS - SI(TEICH): 47 ML/M^2
BH CV ECHO MEAS - SV(AO): 273.6 ML
BH CV ECHO MEAS - SV(CUBED): 116 ML
BH CV ECHO MEAS - SV(MOD-SP4): 20.2 ML
BH CV ECHO MEAS - SV(TEICH): 93 ML
BH CV ECHO MEAS - TR MAX VEL: 283 CM/SEC
BILIRUB SERPL-MCNC: 0.3 MG/DL (ref 0.2–1.2)
BILIRUB SERPL-MCNC: 0.6 MG/DL (ref 0.2–1.2)
BILIRUB SERPL-MCNC: 0.7 MG/DL (ref 0.2–1.2)
BILIRUB SERPL-MCNC: 0.7 MG/DL (ref 0.2–1.2)
BILIRUB UR QL STRIP: NEGATIVE
BLD GP AB SCN SERPL QL: NEGATIVE
BODY TEMPERATURE: 0 C
BODY TEMPERATURE: 0 C
BUN BLD-MCNC: 13 MG/DL (ref 8–23)
BUN BLD-MCNC: 15 MG/DL (ref 8–23)
BUN BLD-MCNC: 18 MG/DL (ref 8–23)
BUN BLD-MCNC: 20 MG/DL (ref 8–23)
BUN BLD-MCNC: 26 MG/DL (ref 8–23)
BUN/CREAT SERPL: 14.7 (ref 7–25)
BUN/CREAT SERPL: 16.9 (ref 7–25)
BUN/CREAT SERPL: 19.6 (ref 7–25)
BUN/CREAT SERPL: 23.8 (ref 7–25)
BUN/CREAT SERPL: 28.9 (ref 7–25)
CALCIUM SPEC-SCNC: 9.1 MG/DL (ref 8.6–10.5)
CALCIUM SPEC-SCNC: 9.3 MG/DL (ref 8.6–10.5)
CALCIUM SPEC-SCNC: 9.4 MG/DL (ref 8.6–10.5)
CALCIUM SPEC-SCNC: 9.4 MG/DL (ref 8.6–10.5)
CALCIUM SPEC-SCNC: 9.5 MG/DL (ref 8.6–10.5)
CHLORIDE SERPL-SCNC: 102 MMOL/L (ref 98–107)
CHLORIDE SERPL-SCNC: 102 MMOL/L (ref 98–107)
CHLORIDE SERPL-SCNC: 103 MMOL/L (ref 98–107)
CHLORIDE SERPL-SCNC: 103 MMOL/L (ref 98–107)
CHLORIDE SERPL-SCNC: 106 MMOL/L (ref 98–107)
CLARITY UR: CLEAR
CO2 BLDA-SCNC: 26.4 MMOL/L (ref 22–33)
CO2 BLDA-SCNC: 27.4 MMOL/L (ref 22–33)
CO2 SERPL-SCNC: 23.9 MMOL/L (ref 22–29)
CO2 SERPL-SCNC: 24.2 MMOL/L (ref 22–29)
CO2 SERPL-SCNC: 26.3 MMOL/L (ref 22–29)
CO2 SERPL-SCNC: 28.6 MMOL/L (ref 22–29)
CO2 SERPL-SCNC: 29.1 MMOL/L (ref 22–29)
COHGB MFR BLD: 1.1 % (ref 0–5)
COHGB MFR BLD: 1.2 % (ref 0–5)
COLOR UR: YELLOW
CREAT BLD-MCNC: 0.77 MG/DL (ref 0.76–1.27)
CREAT BLD-MCNC: 0.84 MG/DL (ref 0.76–1.27)
CREAT BLD-MCNC: 0.9 MG/DL (ref 0.76–1.27)
CREAT BLD-MCNC: 0.92 MG/DL (ref 0.76–1.27)
CREAT BLD-MCNC: 1.02 MG/DL (ref 0.76–1.27)
CRP SERPL-MCNC: 0.28 MG/DL (ref 0–0.5)
D-LACTATE SERPL-SCNC: 2.5 MMOL/L (ref 0.5–2)
DEPRECATED RDW RBC AUTO: 42.4 FL (ref 37–54)
DEPRECATED RDW RBC AUTO: 45.1 FL (ref 37–54)
DEPRECATED RDW RBC AUTO: 48.9 FL (ref 37–54)
DEPRECATED RDW RBC AUTO: 51.5 FL (ref 37–54)
EOSINOPHIL # BLD AUTO: 0.03 10*3/MM3 (ref 0–0.4)
EOSINOPHIL # BLD AUTO: 0.08 10*3/MM3 (ref 0–0.4)
EOSINOPHIL # BLD AUTO: 0.43 10*3/MM3 (ref 0–0.4)
EOSINOPHIL NFR BLD AUTO: 0.3 % (ref 0.3–6.2)
EOSINOPHIL NFR BLD AUTO: 0.8 % (ref 0.3–6.2)
EOSINOPHIL NFR BLD AUTO: 4.8 % (ref 0.3–6.2)
ERYTHROCYTE [DISTWIDTH] IN BLOOD BY AUTOMATED COUNT: 13 % (ref 12.3–15.4)
ERYTHROCYTE [DISTWIDTH] IN BLOOD BY AUTOMATED COUNT: 13.2 % (ref 12.3–15.4)
ERYTHROCYTE [DISTWIDTH] IN BLOOD BY AUTOMATED COUNT: 14.8 % (ref 12.3–15.4)
ERYTHROCYTE [DISTWIDTH] IN BLOOD BY AUTOMATED COUNT: 15.4 % (ref 12.3–15.4)
FERRITIN SERPL-MCNC: 1005 NG/ML (ref 30–400)
FLUAV AG NPH QL: NEGATIVE
FLUBV AG NPH QL IA: NEGATIVE
GAS FLOW AIRWAY: 15 LPM
GAS FLOW AIRWAY: 15 LPM
GFR SERPL CREATININE-BSD FRML MDRD: 71 ML/MIN/1.73
GFR SERPL CREATININE-BSD FRML MDRD: 80 ML/MIN/1.73
GFR SERPL CREATININE-BSD FRML MDRD: 82 ML/MIN/1.73
GFR SERPL CREATININE-BSD FRML MDRD: 89 ML/MIN/1.73
GFR SERPL CREATININE-BSD FRML MDRD: 99 ML/MIN/1.73
GLOBULIN UR ELPH-MCNC: 2.7 GM/DL
GLOBULIN UR ELPH-MCNC: 3 GM/DL
GLOBULIN UR ELPH-MCNC: 3.1 GM/DL
GLOBULIN UR ELPH-MCNC: 3.5 GM/DL
GLUCOSE BLD-MCNC: 116 MG/DL (ref 65–99)
GLUCOSE BLD-MCNC: 117 MG/DL (ref 65–99)
GLUCOSE BLD-MCNC: 126 MG/DL (ref 65–99)
GLUCOSE BLD-MCNC: 140 MG/DL (ref 65–99)
GLUCOSE BLD-MCNC: 163 MG/DL (ref 65–99)
GLUCOSE UR STRIP-MCNC: NEGATIVE MG/DL
HBV CORE AB SER DONR QL IA: NEGATIVE
HBV CORE AB SER DONR QL IA: NEGATIVE
HBV SURFACE AB SER RIA-ACNC: NORMAL
HBV SURFACE AB SER RIA-ACNC: REACTIVE
HBV SURFACE AG SERPL QL IA: NORMAL
HBV SURFACE AG SERPL QL IA: NORMAL
HCO3 BLDA-SCNC: 25 MMOL/L (ref 20–26)
HCO3 BLDA-SCNC: 26 MMOL/L (ref 20–26)
HCT VFR BLD AUTO: 40 % (ref 37.5–51)
HCT VFR BLD AUTO: 42.4 % (ref 37.5–51)
HCT VFR BLD AUTO: 45.9 % (ref 37.5–51)
HCT VFR BLD AUTO: 48.4 % (ref 37.5–51)
HCT VFR BLD CALC: 37.9 % (ref 38–51)
HCT VFR BLD CALC: 38.1 % (ref 38–51)
HCV AB SER DONR QL: NORMAL
HGB BLD-MCNC: 12.4 G/DL (ref 13–17.7)
HGB BLD-MCNC: 12.9 G/DL (ref 13–17.7)
HGB BLD-MCNC: 14.4 G/DL (ref 13–17.7)
HGB BLD-MCNC: 15.2 G/DL (ref 13–17.7)
HGB BLDA-MCNC: 12.4 G/DL (ref 14–18)
HGB BLDA-MCNC: 12.4 G/DL (ref 14–18)
HGB UR QL STRIP.AUTO: NEGATIVE
HOLD SPECIMEN: NORMAL
HOROWITZ INDEX BLD+IHG-RTO: 80 %
HOROWITZ INDEX BLD+IHG-RTO: 80 %
HYPOCHROMIA BLD QL: NORMAL
IMM GRANULOCYTES # BLD AUTO: 0.08 10*3/MM3 (ref 0–0.05)
IMM GRANULOCYTES # BLD AUTO: 0.14 10*3/MM3 (ref 0–0.05)
IMM GRANULOCYTES # BLD AUTO: 0.5 10*3/MM3 (ref 0–0.05)
IMM GRANULOCYTES NFR BLD AUTO: 0.9 % (ref 0–0.5)
IMM GRANULOCYTES NFR BLD AUTO: 1.4 % (ref 0–0.5)
IMM GRANULOCYTES NFR BLD AUTO: 4.4 % (ref 0–0.5)
INR PPP: 0.92 (ref 0.9–1.1)
KETONES UR QL STRIP: NEGATIVE
LACTATE HOLD SPECIMEN: NORMAL
LDH SERPL-CCNC: 319 U/L (ref 135–225)
LDH SERPL-CCNC: 395 U/L (ref 135–225)
LDH SERPL-CCNC: 471 U/L (ref 135–225)
LDH SERPL-CCNC: 595 U/L (ref 135–225)
LEUKOCYTE ESTERASE UR QL STRIP.AUTO: NEGATIVE
LYMPHOCYTES # BLD AUTO: 1.68 10*3/MM3 (ref 0.7–3.1)
LYMPHOCYTES # BLD AUTO: 1.82 10*3/MM3 (ref 0.7–3.1)
LYMPHOCYTES # BLD AUTO: 1.99 10*3/MM3 (ref 0.7–3.1)
LYMPHOCYTES # BLD MANUAL: 1.19 10*3/MM3 (ref 0.7–3.1)
LYMPHOCYTES NFR BLD AUTO: 15.9 % (ref 19.6–45.3)
LYMPHOCYTES NFR BLD AUTO: 17.2 % (ref 19.6–45.3)
LYMPHOCYTES NFR BLD AUTO: 22 % (ref 19.6–45.3)
LYMPHOCYTES NFR BLD MANUAL: 2 % (ref 19.6–45.3)
LYMPHOCYTES NFR BLD MANUAL: 3 % (ref 5–12)
Lab: ABNORMAL
Lab: ABNORMAL
MAGNESIUM SERPL-MCNC: 1.9 MG/DL (ref 1.6–2.4)
MAGNESIUM SERPL-MCNC: 2 MG/DL (ref 1.6–2.4)
MAGNESIUM SERPL-MCNC: 2 MG/DL (ref 1.6–2.4)
MAXIMAL PREDICTED HEART RATE: 146 BPM
MCH RBC QN AUTO: 28.1 PG (ref 26.6–33)
MCH RBC QN AUTO: 28.2 PG (ref 26.6–33)
MCH RBC QN AUTO: 28.8 PG (ref 26.6–33)
MCH RBC QN AUTO: 28.8 PG (ref 26.6–33)
MCHC RBC AUTO-ENTMCNC: 30.4 G/DL (ref 31.5–35.7)
MCHC RBC AUTO-ENTMCNC: 31 G/DL (ref 31.5–35.7)
MCHC RBC AUTO-ENTMCNC: 31.4 G/DL (ref 31.5–35.7)
MCHC RBC AUTO-ENTMCNC: 31.4 G/DL (ref 31.5–35.7)
MCV RBC AUTO: 89.5 FL (ref 79–97)
MCV RBC AUTO: 91.8 FL (ref 79–97)
MCV RBC AUTO: 92.8 FL (ref 79–97)
MCV RBC AUTO: 92.8 FL (ref 79–97)
METAMYELOCYTES NFR BLD MANUAL: 3 % (ref 0–0)
METHGB BLD QL: 0.2 % (ref 0–3)
METHGB BLD QL: 0.3 % (ref 0–3)
MODALITY: ABNORMAL
MODALITY: ABNORMAL
MONOCYTES # BLD AUTO: 0.96 10*3/MM3 (ref 0.1–0.9)
MONOCYTES # BLD AUTO: 1.28 10*3/MM3 (ref 0.1–0.9)
MONOCYTES # BLD AUTO: 1.59 10*3/MM3 (ref 0.1–0.9)
MONOCYTES # BLD AUTO: 1.78 10*3/MM3 (ref 0.1–0.9)
MONOCYTES NFR BLD AUTO: 13.9 % (ref 5–12)
MONOCYTES NFR BLD AUTO: 14.1 % (ref 5–12)
MONOCYTES NFR BLD AUTO: 9.8 % (ref 5–12)
NEUTROPHILS # BLD AUTO: 5.15 10*3/MM3 (ref 1.7–7)
NEUTROPHILS # BLD AUTO: 54.71 10*3/MM3 (ref 1.7–7)
NEUTROPHILS # BLD AUTO: 6.83 10*3/MM3 (ref 1.7–7)
NEUTROPHILS # BLD AUTO: 7.2 10*3/MM3 (ref 1.7–7)
NEUTROPHILS NFR BLD AUTO: 56.9 % (ref 42.7–76)
NEUTROPHILS NFR BLD AUTO: 62.7 % (ref 42.7–76)
NEUTROPHILS NFR BLD AUTO: 70.2 % (ref 42.7–76)
NEUTROPHILS NFR BLD MANUAL: 76 % (ref 42.7–76)
NEUTS BAND NFR BLD MANUAL: 16 % (ref 0–5)
NEUTS VAC BLD QL SMEAR: ABNORMAL
NITRITE UR QL STRIP: NEGATIVE
NOTE: ABNORMAL
NOTE: ABNORMAL
NRBC BLD AUTO-RTO: 0 /100 WBC (ref 0–0.2)
NT-PROBNP SERPL-MCNC: 2473 PG/ML (ref 5–900)
OXYHGB MFR BLDV: 87.3 % (ref 94–99)
OXYHGB MFR BLDV: 91.5 % (ref 94–99)
PCO2 BLDA: 42.9 MM HG (ref 35–45)
PCO2 BLDA: 43.4 MM HG (ref 35–45)
PCO2 TEMP ADJ BLD: ABNORMAL MM[HG]
PCO2 TEMP ADJ BLD: ABNORMAL MM[HG]
PH BLDA: 7.37 PH UNITS (ref 7.35–7.45)
PH BLDA: 7.39 PH UNITS (ref 7.35–7.45)
PH UR STRIP.AUTO: <=5 [PH] (ref 5–8)
PH, TEMP CORRECTED: ABNORMAL
PH, TEMP CORRECTED: ABNORMAL
PHOSPHATE SERPL-MCNC: 3.4 MG/DL (ref 2.5–4.5)
PHOSPHATE SERPL-MCNC: 3.5 MG/DL (ref 2.5–4.5)
PLAT MORPH BLD: NORMAL
PLAT MORPH BLD: NORMAL
PLATELET # BLD AUTO: 195 10*3/MM3 (ref 140–450)
PLATELET # BLD AUTO: 203 10*3/MM3 (ref 140–450)
PLATELET # BLD AUTO: 392 10*3/MM3 (ref 140–450)
PLATELET # BLD AUTO: 427 10*3/MM3 (ref 140–450)
PMV BLD AUTO: 10.2 FL (ref 6–12)
PMV BLD AUTO: 9.4 FL (ref 6–12)
PMV BLD AUTO: 9.7 FL (ref 6–12)
PMV BLD AUTO: 9.9 FL (ref 6–12)
PO2 BLDA: 58.6 MM HG (ref 83–108)
PO2 BLDA: 68 MM HG (ref 83–108)
PO2 TEMP ADJ BLD: ABNORMAL MM[HG]
PO2 TEMP ADJ BLD: ABNORMAL MM[HG]
POTASSIUM BLD-SCNC: 3.9 MMOL/L (ref 3.5–5.2)
POTASSIUM BLD-SCNC: 4.2 MMOL/L (ref 3.5–5.2)
POTASSIUM BLD-SCNC: 4.2 MMOL/L (ref 3.5–5.2)
POTASSIUM BLD-SCNC: 4.3 MMOL/L (ref 3.5–5.2)
POTASSIUM BLD-SCNC: 4.4 MMOL/L (ref 3.5–5.2)
PROCALCITONIN SERPL-MCNC: 0.09 NG/ML (ref 0.1–0.25)
PROT SERPL-MCNC: 6.3 G/DL (ref 6–8.5)
PROT SERPL-MCNC: 6.6 G/DL (ref 6–8.5)
PROT SERPL-MCNC: 6.8 G/DL (ref 6–8.5)
PROT SERPL-MCNC: 7.4 G/DL (ref 6–8.5)
PROT UR QL STRIP: NEGATIVE
PROTHROMBIN TIME: 12.9 SECONDS (ref 11–15.4)
RBC # BLD AUTO: 4.31 10*6/MM3 (ref 4.14–5.8)
RBC # BLD AUTO: 4.57 10*6/MM3 (ref 4.14–5.8)
RBC # BLD AUTO: 5.13 10*6/MM3 (ref 4.14–5.8)
RBC # BLD AUTO: 5.27 10*6/MM3 (ref 4.14–5.8)
RBC MORPH BLD: NORMAL
RH BLD: POSITIVE
SAO2 % BLDCOA: 88.5 % (ref 94–99)
SAO2 % BLDCOA: 92.8 % (ref 94–99)
SARS-COV-2 RNA RESP QL NAA+PROBE: NOT DETECTED
SCAN SLIDE: NORMAL
SODIUM BLD-SCNC: 140 MMOL/L (ref 136–145)
SODIUM BLD-SCNC: 142 MMOL/L (ref 136–145)
SODIUM BLD-SCNC: 142 MMOL/L (ref 136–145)
SODIUM BLD-SCNC: 143 MMOL/L (ref 136–145)
SODIUM BLD-SCNC: 144 MMOL/L (ref 136–145)
SP GR UR STRIP: 1.02 (ref 1–1.03)
STRESS TARGET HR: 124 BPM
T&S EXPIRATION DATE: NORMAL
T4 FREE SERPL-MCNC: 1.29 NG/DL (ref 0.93–1.7)
TOXIC GRANULATION: ABNORMAL
TROPONIN T SERPL-MCNC: 0.01 NG/ML (ref 0–0.03)
TROPONIN T SERPL-MCNC: 0.01 NG/ML (ref 0–0.03)
TSH SERPL DL<=0.05 MIU/L-ACNC: 1.72 UIU/ML (ref 0.27–4.2)
URATE SERPL-MCNC: 5.7 MG/DL (ref 3.4–7)
URATE SERPL-MCNC: 5.9 MG/DL (ref 3.4–7)
URATE SERPL-MCNC: 7.3 MG/DL (ref 3.4–7)
UROBILINOGEN UR QL STRIP: NORMAL
VENTILATOR MODE: ABNORMAL
VENTILATOR MODE: ABNORMAL
WBC NRBC COR # BLD: 11.46 10*3/MM3 (ref 3.4–10.8)
WBC NRBC COR # BLD: 59.47 10*3/MM3 (ref 3.4–10.8)
WBC NRBC COR # BLD: 9.05 10*3/MM3 (ref 3.4–10.8)
WBC NRBC COR # BLD: 9.75 10*3/MM3 (ref 3.4–10.8)
WHOLE BLOOD HOLD SPECIMEN: NORMAL
WHOLE BLOOD HOLD SPECIMEN: NORMAL

## 2020-01-01 PROCEDURE — A9577 INJ MULTIHANCE: HCPCS | Performed by: INTERNAL MEDICINE

## 2020-01-01 PROCEDURE — 80053 COMPREHEN METABOLIC PANEL: CPT | Performed by: INTERNAL MEDICINE

## 2020-01-01 PROCEDURE — 25010000002 PEGFILGRASTIM 6 MG/0.6ML PREFILLED SYRINGE KIT: Performed by: INTERNAL MEDICINE

## 2020-01-01 PROCEDURE — 86901 BLOOD TYPING SEROLOGIC RH(D): CPT

## 2020-01-01 PROCEDURE — 99214 OFFICE O/P EST MOD 30 MIN: CPT | Performed by: INTERNAL MEDICINE

## 2020-01-01 PROCEDURE — 25010000002 DEXAMETHASONE SODIUM PHOSPHATE 20 MG/5ML SOLUTION: Performed by: INTERNAL MEDICINE

## 2020-01-01 PROCEDURE — 96367 TX/PROPH/DG ADDL SEQ IV INF: CPT

## 2020-01-01 PROCEDURE — 84550 ASSAY OF BLOOD/URIC ACID: CPT | Performed by: INTERNAL MEDICINE

## 2020-01-01 PROCEDURE — 84145 PROCALCITONIN (PCT): CPT | Performed by: EMERGENCY MEDICINE

## 2020-01-01 PROCEDURE — 96375 TX/PRO/DX INJ NEW DRUG ADDON: CPT

## 2020-01-01 PROCEDURE — 25010000002 MIDAZOLAM PER 1 MG: Performed by: EMERGENCY MEDICINE

## 2020-01-01 PROCEDURE — 25010000002 PROPOFOL 10 MG/ML EMULSION: Performed by: NURSE ANESTHETIST, CERTIFIED REGISTERED

## 2020-01-01 PROCEDURE — 96411 CHEMO IV PUSH ADDL DRUG: CPT

## 2020-01-01 PROCEDURE — 83615 LACTATE (LD) (LDH) ENZYME: CPT | Performed by: INTERNAL MEDICINE

## 2020-01-01 PROCEDURE — 99203 OFFICE O/P NEW LOW 30 MIN: CPT | Performed by: SURGERY

## 2020-01-01 PROCEDURE — 96361 HYDRATE IV INFUSION ADD-ON: CPT

## 2020-01-01 PROCEDURE — 25010000002 ONDANSETRON PER 1 MG: Performed by: NURSE ANESTHETIST, CERTIFIED REGISTERED

## 2020-01-01 PROCEDURE — 25010000002 PALONOSETRON PER 25 MCG: Performed by: INTERNAL MEDICINE

## 2020-01-01 PROCEDURE — 96365 THER/PROPH/DIAG IV INF INIT: CPT

## 2020-01-01 PROCEDURE — 87340 HEPATITIS B SURFACE AG IA: CPT

## 2020-01-01 PROCEDURE — 85730 THROMBOPLASTIN TIME PARTIAL: CPT | Performed by: EMERGENCY MEDICINE

## 2020-01-01 PROCEDURE — 25010000002 CYCLOPHOSPHAMIDE PER 100 MG: Performed by: INTERNAL MEDICINE

## 2020-01-01 PROCEDURE — 25010000003 LIDOCAINE 1 % SOLUTION: Performed by: SURGERY

## 2020-01-01 PROCEDURE — C1788 PORT, INDWELLING, IMP: HCPCS | Performed by: SURGERY

## 2020-01-01 PROCEDURE — 86900 BLOOD TYPING SEROLOGIC ABO: CPT

## 2020-01-01 PROCEDURE — 25010000003 HEPARIN LOCK FLUCH PER 10 UNITS: Performed by: SURGERY

## 2020-01-01 PROCEDURE — 83735 ASSAY OF MAGNESIUM: CPT | Performed by: EMERGENCY MEDICINE

## 2020-01-01 PROCEDURE — 71045 X-RAY EXAM CHEST 1 VIEW: CPT

## 2020-01-01 PROCEDURE — 99214 OFFICE O/P EST MOD 30 MIN: CPT | Performed by: NURSE PRACTITIONER

## 2020-01-01 PROCEDURE — 71275 CT ANGIOGRAPHY CHEST: CPT | Performed by: RADIOLOGY

## 2020-01-01 PROCEDURE — 31500 INSERT EMERGENCY AIRWAY: CPT

## 2020-01-01 PROCEDURE — 96413 CHEMO IV INFUSION 1 HR: CPT

## 2020-01-01 PROCEDURE — 84439 ASSAY OF FREE THYROXINE: CPT | Performed by: EMERGENCY MEDICINE

## 2020-01-01 PROCEDURE — 86850 RBC ANTIBODY SCREEN: CPT | Performed by: EMERGENCY MEDICINE

## 2020-01-01 PROCEDURE — 86900 BLOOD TYPING SEROLOGIC ABO: CPT | Performed by: EMERGENCY MEDICINE

## 2020-01-01 PROCEDURE — 84100 ASSAY OF PHOSPHORUS: CPT | Performed by: INTERNAL MEDICINE

## 2020-01-01 PROCEDURE — 83605 ASSAY OF LACTIC ACID: CPT | Performed by: EMERGENCY MEDICINE

## 2020-01-01 PROCEDURE — A9577 INJ MULTIHANCE: HCPCS

## 2020-01-01 PROCEDURE — 94799 UNLISTED PULMONARY SVC/PX: CPT

## 2020-01-01 PROCEDURE — 0 FLUDEOXYGLUCOSE F18 SOLUTION: Performed by: INTERNAL MEDICINE

## 2020-01-01 PROCEDURE — 87340 HEPATITIS B SURFACE AG IA: CPT | Performed by: INTERNAL MEDICINE

## 2020-01-01 PROCEDURE — 82728 ASSAY OF FERRITIN: CPT | Performed by: EMERGENCY MEDICINE

## 2020-01-01 PROCEDURE — 36415 COLL VENOUS BLD VENIPUNCTURE: CPT

## 2020-01-01 PROCEDURE — 82375 ASSAY CARBOXYHB QUANT: CPT

## 2020-01-01 PROCEDURE — 25010000002 PIPERACILLIN-TAZOBACTAM: Performed by: EMERGENCY MEDICINE

## 2020-01-01 PROCEDURE — 86140 C-REACTIVE PROTEIN: CPT | Performed by: EMERGENCY MEDICINE

## 2020-01-01 PROCEDURE — 25010000002 VINCRISTINE PER 1 MG: Performed by: INTERNAL MEDICINE

## 2020-01-01 PROCEDURE — 87635 SARS-COV-2 COVID-19 AMP PRB: CPT | Performed by: EMERGENCY MEDICINE

## 2020-01-01 PROCEDURE — 83880 ASSAY OF NATRIURETIC PEPTIDE: CPT | Performed by: EMERGENCY MEDICINE

## 2020-01-01 PROCEDURE — 25010000002 FENTANYL CITRATE (PF) 100 MCG/2ML SOLUTION: Performed by: NURSE ANESTHETIST, CERTIFIED REGISTERED

## 2020-01-01 PROCEDURE — 76000 FLUOROSCOPY <1 HR PHYS/QHP: CPT | Performed by: RADIOLOGY

## 2020-01-01 PROCEDURE — 71045 X-RAY EXAM CHEST 1 VIEW: CPT | Performed by: RADIOLOGY

## 2020-01-01 PROCEDURE — 84484 ASSAY OF TROPONIN QUANT: CPT | Performed by: EMERGENCY MEDICINE

## 2020-01-01 PROCEDURE — 25010000002 DIPHENHYDRAMINE PER 50 MG: Performed by: INTERNAL MEDICINE

## 2020-01-01 PROCEDURE — 25010000002 MIDAZOLAM PER 1 MG: Performed by: NURSE ANESTHETIST, CERTIFIED REGISTERED

## 2020-01-01 PROCEDURE — 96415 CHEMO IV INFUSION ADDL HR: CPT

## 2020-01-01 PROCEDURE — 25010000003 HEPARIN LOCK FLUCH PER 10 UNITS

## 2020-01-01 PROCEDURE — 96377 APPLICATON ON-BODY INJECTOR: CPT

## 2020-01-01 PROCEDURE — 96417 CHEMO IV INFUS EACH ADDL SEQ: CPT

## 2020-01-01 PROCEDURE — 25010000002 RITUXIMAB 10 MG/ML SOLUTION 10 ML VIAL: Performed by: INTERNAL MEDICINE

## 2020-01-01 PROCEDURE — 51702 INSERT TEMP BLADDER CATH: CPT

## 2020-01-01 PROCEDURE — 99285 EMERGENCY DEPT VISIT HI MDM: CPT

## 2020-01-01 PROCEDURE — 80048 BASIC METABOLIC PNL TOTAL CA: CPT | Performed by: INTERNAL MEDICINE

## 2020-01-01 PROCEDURE — 0 IOVERSOL 74 % SOLUTION: Performed by: EMERGENCY MEDICINE

## 2020-01-01 PROCEDURE — 78815 PET IMAGE W/CT SKULL-THIGH: CPT | Performed by: RADIOLOGY

## 2020-01-01 PROCEDURE — 99205 OFFICE O/P NEW HI 60 MIN: CPT | Performed by: INTERNAL MEDICINE

## 2020-01-01 PROCEDURE — 93005 ELECTROCARDIOGRAM TRACING: CPT

## 2020-01-01 PROCEDURE — 93010 ELECTROCARDIOGRAM REPORT: CPT | Performed by: INTERNAL MEDICINE

## 2020-01-01 PROCEDURE — 82805 BLOOD GASES W/O2 SATURATION: CPT

## 2020-01-01 PROCEDURE — 25010000002 PROPOFOL 10 MG/ML EMULSION

## 2020-01-01 PROCEDURE — 25010000003 CEFAZOLIN SODIUM-DEXTROSE 2-3 GM-%(50ML) RECONSTITUTED SOLUTION: Performed by: SURGERY

## 2020-01-01 PROCEDURE — 85025 COMPLETE CBC W/AUTO DIFF WBC: CPT | Performed by: INTERNAL MEDICINE

## 2020-01-01 PROCEDURE — 93005 ELECTROCARDIOGRAM TRACING: CPT | Performed by: EMERGENCY MEDICINE

## 2020-01-01 PROCEDURE — 83050 HGB METHEMOGLOBIN QUAN: CPT

## 2020-01-01 PROCEDURE — 93306 TTE W/DOPPLER COMPLETE: CPT | Performed by: INTERNAL MEDICINE

## 2020-01-01 PROCEDURE — 78815 PET IMAGE W/CT SKULL-THIGH: CPT

## 2020-01-01 PROCEDURE — 86803 HEPATITIS C AB TEST: CPT | Performed by: INTERNAL MEDICINE

## 2020-01-01 PROCEDURE — 76000 FLUOROSCOPY <1 HR PHYS/QHP: CPT

## 2020-01-01 PROCEDURE — 85007 BL SMEAR W/DIFF WBC COUNT: CPT

## 2020-01-01 PROCEDURE — 85610 PROTHROMBIN TIME: CPT | Performed by: EMERGENCY MEDICINE

## 2020-01-01 PROCEDURE — 96368 THER/DIAG CONCURRENT INF: CPT

## 2020-01-01 PROCEDURE — 85007 BL SMEAR W/DIFF WBC COUNT: CPT | Performed by: EMERGENCY MEDICINE

## 2020-01-01 PROCEDURE — 83735 ASSAY OF MAGNESIUM: CPT | Performed by: INTERNAL MEDICINE

## 2020-01-01 PROCEDURE — 25010000002 RITUXIMAB 500 MG/50ML SOLUTION 50 ML VIAL: Performed by: INTERNAL MEDICINE

## 2020-01-01 PROCEDURE — 0 GADOBENATE DIMEGLUMINE 529 MG/ML SOLUTION: Performed by: INTERNAL MEDICINE

## 2020-01-01 PROCEDURE — 85025 COMPLETE CBC W/AUTO DIFF WBC: CPT | Performed by: EMERGENCY MEDICINE

## 2020-01-01 PROCEDURE — 25010000002 DOXORUBICIN PER 10 MG: Performed by: INTERNAL MEDICINE

## 2020-01-01 PROCEDURE — 86704 HEP B CORE ANTIBODY TOTAL: CPT

## 2020-01-01 PROCEDURE — 83615 LACTATE (LD) (LDH) ENZYME: CPT | Performed by: EMERGENCY MEDICINE

## 2020-01-01 PROCEDURE — 86706 HEP B SURFACE ANTIBODY: CPT

## 2020-01-01 PROCEDURE — 87040 BLOOD CULTURE FOR BACTERIA: CPT | Performed by: EMERGENCY MEDICINE

## 2020-01-01 PROCEDURE — 70553 MRI BRAIN STEM W/O & W/DYE: CPT

## 2020-01-01 PROCEDURE — 25010000002 FOSAPREPITANT PER 1 MG: Performed by: INTERNAL MEDICINE

## 2020-01-01 PROCEDURE — 80053 COMPREHEN METABOLIC PANEL: CPT

## 2020-01-01 PROCEDURE — 36600 WITHDRAWAL OF ARTERIAL BLOOD: CPT

## 2020-01-01 PROCEDURE — 80053 COMPREHEN METABOLIC PANEL: CPT | Performed by: EMERGENCY MEDICINE

## 2020-01-01 PROCEDURE — 93306 TTE W/DOPPLER COMPLETE: CPT

## 2020-01-01 PROCEDURE — 0 GADOBENATE DIMEGLUMINE 529 MG/ML SOLUTION

## 2020-01-01 PROCEDURE — 81003 URINALYSIS AUTO W/O SCOPE: CPT | Performed by: EMERGENCY MEDICINE

## 2020-01-01 PROCEDURE — 36561 INSERT TUNNELED CV CATH: CPT | Performed by: SURGERY

## 2020-01-01 PROCEDURE — 25010000003 HEPARIN LOCK FLUCH PER 10 UNITS: Performed by: INTERNAL MEDICINE

## 2020-01-01 PROCEDURE — 96376 TX/PRO/DX INJ SAME DRUG ADON: CPT

## 2020-01-01 PROCEDURE — 25010000002 RITUXIMAB 10 MG/ML SOLUTION 50 ML VIAL: Performed by: INTERNAL MEDICINE

## 2020-01-01 PROCEDURE — 87804 INFLUENZA ASSAY W/OPTIC: CPT | Performed by: EMERGENCY MEDICINE

## 2020-01-01 PROCEDURE — 86706 HEP B SURFACE ANTIBODY: CPT | Performed by: INTERNAL MEDICINE

## 2020-01-01 PROCEDURE — 70553 MRI BRAIN STEM W/O & W/DYE: CPT | Performed by: RADIOLOGY

## 2020-01-01 PROCEDURE — 96366 THER/PROPH/DIAG IV INF ADDON: CPT

## 2020-01-01 PROCEDURE — 25010000002 SUCCINYLCHOLINE PER 20 MG: Performed by: EMERGENCY MEDICINE

## 2020-01-01 PROCEDURE — 99211 OFF/OP EST MAY X REQ PHY/QHP: CPT | Performed by: INTERNAL MEDICINE

## 2020-01-01 PROCEDURE — 71275 CT ANGIOGRAPHY CHEST: CPT

## 2020-01-01 PROCEDURE — 86901 BLOOD TYPING SEROLOGIC RH(D): CPT | Performed by: EMERGENCY MEDICINE

## 2020-01-01 PROCEDURE — 25010000002 VANCOMYCIN: Performed by: EMERGENCY MEDICINE

## 2020-01-01 PROCEDURE — A9552 F18 FDG: HCPCS | Performed by: INTERNAL MEDICINE

## 2020-01-01 PROCEDURE — 85025 COMPLETE CBC W/AUTO DIFF WBC: CPT

## 2020-01-01 PROCEDURE — 94002 VENT MGMT INPAT INIT DAY: CPT

## 2020-01-01 PROCEDURE — 77001 FLUOROGUIDE FOR VEIN DEVICE: CPT | Performed by: SURGERY

## 2020-01-01 PROCEDURE — 84443 ASSAY THYROID STIM HORMONE: CPT | Performed by: EMERGENCY MEDICINE

## 2020-01-01 PROCEDURE — 76937 US GUIDE VASCULAR ACCESS: CPT | Performed by: SURGERY

## 2020-01-01 PROCEDURE — 86704 HEP B CORE ANTIBODY TOTAL: CPT | Performed by: INTERNAL MEDICINE

## 2020-01-01 DEVICE — VACCESS CT POWER-INJECTABLE IMPLANTABLE PORT (WITH SUTURE PLUGS) (8F)
Type: IMPLANTABLE DEVICE | Status: FUNCTIONAL
Brand: VACCESS

## 2020-01-01 RX ORDER — ONDANSETRON 2 MG/ML
INJECTION INTRAMUSCULAR; INTRAVENOUS AS NEEDED
Status: DISCONTINUED | OUTPATIENT
Start: 2020-01-01 | End: 2020-01-01 | Stop reason: SURG

## 2020-01-01 RX ORDER — ALLOPURINOL 300 MG/1
300 TABLET ORAL DAILY
Qty: 30 TABLET | Refills: 3 | Status: SHIPPED | OUTPATIENT
Start: 2020-01-01 | End: 2020-01-01

## 2020-01-01 RX ORDER — LIDOCAINE HYDROCHLORIDE 20 MG/ML
INJECTION, SOLUTION INFILTRATION; PERINEURAL AS NEEDED
Status: DISCONTINUED | OUTPATIENT
Start: 2020-01-01 | End: 2020-01-01 | Stop reason: SURG

## 2020-01-01 RX ORDER — PREDNISONE 50 MG/1
100 TABLET ORAL DAILY
Qty: 10 TABLET | Refills: 5 | Status: SHIPPED | OUTPATIENT
Start: 2020-01-01 | End: 2020-01-01

## 2020-01-01 RX ORDER — SODIUM CHLORIDE 0.9 % (FLUSH) 0.9 %
10 SYRINGE (ML) INJECTION EVERY 12 HOURS SCHEDULED
Status: DISCONTINUED | OUTPATIENT
Start: 2020-01-01 | End: 2020-01-01 | Stop reason: HOSPADM

## 2020-01-01 RX ORDER — CEFAZOLIN SODIUM 2 G/50ML
2 SOLUTION INTRAVENOUS ONCE
Status: CANCELLED | OUTPATIENT
Start: 2020-01-01 | End: 2020-01-01

## 2020-01-01 RX ORDER — ALBUTEROL SULFATE 90 UG/1
6 AEROSOL, METERED RESPIRATORY (INHALATION) ONCE
Status: COMPLETED | OUTPATIENT
Start: 2020-01-01 | End: 2020-01-01

## 2020-01-01 RX ORDER — SODIUM CHLORIDE 0.9 % (FLUSH) 0.9 %
10 SYRINGE (ML) INJECTION AS NEEDED
Status: CANCELLED | OUTPATIENT
Start: 2020-01-01

## 2020-01-01 RX ORDER — DOXORUBICIN HYDROCHLORIDE 2 MG/ML
50 INJECTION, SOLUTION INTRAVENOUS ONCE
Status: CANCELLED | OUTPATIENT
Start: 2020-01-01

## 2020-01-01 RX ORDER — FAMOTIDINE 10 MG/ML
INJECTION, SOLUTION INTRAVENOUS AS NEEDED
Status: DISCONTINUED | OUTPATIENT
Start: 2020-01-01 | End: 2020-01-01 | Stop reason: SURG

## 2020-01-01 RX ORDER — PROCHLORPERAZINE MALEATE 10 MG
10 TABLET ORAL EVERY 6 HOURS PRN
Qty: 30 TABLET | Refills: 5 | Status: SHIPPED | OUTPATIENT
Start: 2020-01-01

## 2020-01-01 RX ORDER — OXYCODONE HYDROCHLORIDE AND ACETAMINOPHEN 5; 325 MG/1; MG/1
1 TABLET ORAL ONCE AS NEEDED
Status: DISCONTINUED | OUTPATIENT
Start: 2020-01-01 | End: 2020-01-01 | Stop reason: HOSPADM

## 2020-01-01 RX ORDER — ISOSORBIDE MONONITRATE 30 MG/1
30 TABLET, EXTENDED RELEASE ORAL DAILY
Qty: 90 TABLET | Refills: 3 | Status: SHIPPED | OUTPATIENT
Start: 2020-01-01

## 2020-01-01 RX ORDER — HEPARIN SODIUM (PORCINE) LOCK FLUSH IV SOLN 100 UNIT/ML 100 UNIT/ML
500 SOLUTION INTRAVENOUS AS NEEDED
Status: DISCONTINUED | OUTPATIENT
Start: 2020-01-01 | End: 2020-01-01 | Stop reason: HOSPADM

## 2020-01-01 RX ORDER — SODIUM CHLORIDE, SODIUM LACTATE, POTASSIUM CHLORIDE, CALCIUM CHLORIDE 600; 310; 30; 20 MG/100ML; MG/100ML; MG/100ML; MG/100ML
125 INJECTION, SOLUTION INTRAVENOUS CONTINUOUS
Status: DISCONTINUED | OUTPATIENT
Start: 2020-01-01 | End: 2020-01-01 | Stop reason: HOSPADM

## 2020-01-01 RX ORDER — CETIRIZINE HYDROCHLORIDE 10 MG/1
10 TABLET ORAL DAILY
COMMUNITY

## 2020-01-01 RX ORDER — CEFAZOLIN SODIUM 2 G/50ML
2 SOLUTION INTRAVENOUS ONCE
Status: COMPLETED | OUTPATIENT
Start: 2020-01-01 | End: 2020-01-01

## 2020-01-01 RX ORDER — SODIUM CHLORIDE 9 MG/ML
250 INJECTION, SOLUTION INTRAVENOUS ONCE
Status: CANCELLED | OUTPATIENT
Start: 2020-01-01

## 2020-01-01 RX ORDER — MEPERIDINE HYDROCHLORIDE 50 MG/ML
25 INJECTION INTRAMUSCULAR; INTRAVENOUS; SUBCUTANEOUS
Status: CANCELLED | OUTPATIENT
Start: 2020-01-01

## 2020-01-01 RX ORDER — SODIUM CHLORIDE 9 MG/ML
250 INJECTION, SOLUTION INTRAVENOUS ONCE
Status: COMPLETED | OUTPATIENT
Start: 2020-01-01 | End: 2020-01-01

## 2020-01-01 RX ORDER — ETOMIDATE 2 MG/ML
24 INJECTION INTRAVENOUS ONCE
Status: COMPLETED | OUTPATIENT
Start: 2020-01-01 | End: 2020-01-01

## 2020-01-01 RX ORDER — ACETAMINOPHEN 325 MG/1
650 TABLET ORAL ONCE
Status: COMPLETED | OUTPATIENT
Start: 2020-01-01 | End: 2020-01-01

## 2020-01-01 RX ORDER — FAMOTIDINE 10 MG/ML
20 INJECTION, SOLUTION INTRAVENOUS AS NEEDED
Status: CANCELLED | OUTPATIENT
Start: 2020-01-01

## 2020-01-01 RX ORDER — SODIUM CHLORIDE 0.9 % (FLUSH) 0.9 %
10 SYRINGE (ML) INJECTION AS NEEDED
Status: DISCONTINUED | OUTPATIENT
Start: 2020-01-01 | End: 2020-01-01 | Stop reason: HOSPADM

## 2020-01-01 RX ORDER — MIDAZOLAM HYDROCHLORIDE 1 MG/ML
2 INJECTION INTRAMUSCULAR; INTRAVENOUS ONCE
Status: COMPLETED | OUTPATIENT
Start: 2020-01-01 | End: 2020-01-01

## 2020-01-01 RX ORDER — VECURONIUM BROMIDE 1 MG/ML
7.5 INJECTION, POWDER, LYOPHILIZED, FOR SOLUTION INTRAVENOUS ONCE
Status: COMPLETED | OUTPATIENT
Start: 2020-01-01 | End: 2020-01-01

## 2020-01-01 RX ORDER — MAGNESIUM HYDROXIDE 1200 MG/15ML
LIQUID ORAL AS NEEDED
Status: DISCONTINUED | OUTPATIENT
Start: 2020-01-01 | End: 2020-01-01 | Stop reason: HOSPADM

## 2020-01-01 RX ORDER — PALONOSETRON 0.05 MG/ML
0.25 INJECTION, SOLUTION INTRAVENOUS ONCE
Status: CANCELLED | OUTPATIENT
Start: 2020-01-01

## 2020-01-01 RX ORDER — MAGNESIUM SULFATE HEPTAHYDRATE 40 MG/ML
2 INJECTION, SOLUTION INTRAVENOUS ONCE
Status: DISCONTINUED | OUTPATIENT
Start: 2020-01-01 | End: 2020-01-01

## 2020-01-01 RX ORDER — HEPARIN SODIUM (PORCINE) LOCK FLUSH IV SOLN 100 UNIT/ML 100 UNIT/ML
SOLUTION INTRAVENOUS AS NEEDED
Status: DISCONTINUED | OUTPATIENT
Start: 2020-01-01 | End: 2020-01-01 | Stop reason: HOSPADM

## 2020-01-01 RX ORDER — MIDAZOLAM HYDROCHLORIDE 1 MG/ML
INJECTION INTRAMUSCULAR; INTRAVENOUS AS NEEDED
Status: DISCONTINUED | OUTPATIENT
Start: 2020-01-01 | End: 2020-01-01 | Stop reason: SURG

## 2020-01-01 RX ORDER — ACETAMINOPHEN 325 MG/1
650 TABLET ORAL ONCE
Status: CANCELLED | OUTPATIENT
Start: 2020-01-01

## 2020-01-01 RX ORDER — SUCCINYLCHOLINE CHLORIDE 20 MG/ML
120 INJECTION INTRAMUSCULAR; INTRAVENOUS ONCE
Status: COMPLETED | OUTPATIENT
Start: 2020-01-01 | End: 2020-01-01

## 2020-01-01 RX ORDER — MEPERIDINE HYDROCHLORIDE 25 MG/ML
25 INJECTION INTRAMUSCULAR; INTRAVENOUS; SUBCUTANEOUS
Status: CANCELLED | OUTPATIENT
Start: 2020-01-01

## 2020-01-01 RX ORDER — MELATONIN
1000 DAILY
COMMUNITY

## 2020-01-01 RX ORDER — ONDANSETRON 2 MG/ML
4 INJECTION INTRAMUSCULAR; INTRAVENOUS AS NEEDED
Status: DISCONTINUED | OUTPATIENT
Start: 2020-01-01 | End: 2020-01-01 | Stop reason: HOSPADM

## 2020-01-01 RX ORDER — LIDOCAINE HYDROCHLORIDE 10 MG/ML
INJECTION, SOLUTION INFILTRATION; PERINEURAL AS NEEDED
Status: DISCONTINUED | OUTPATIENT
Start: 2020-01-01 | End: 2020-01-01 | Stop reason: HOSPADM

## 2020-01-01 RX ORDER — PALONOSETRON 0.05 MG/ML
0.25 INJECTION, SOLUTION INTRAVENOUS ONCE
Status: COMPLETED | OUTPATIENT
Start: 2020-01-01 | End: 2020-01-01

## 2020-01-01 RX ORDER — PREDNISONE 10 MG/1
10 TABLET ORAL TAKE AS DIRECTED
COMMUNITY
End: 2020-01-01

## 2020-01-01 RX ORDER — FENTANYL CITRATE 50 UG/ML
50 INJECTION, SOLUTION INTRAMUSCULAR; INTRAVENOUS
Status: DISCONTINUED | OUTPATIENT
Start: 2020-01-01 | End: 2020-01-01 | Stop reason: HOSPADM

## 2020-01-01 RX ORDER — DIPHENHYDRAMINE HYDROCHLORIDE 50 MG/ML
50 INJECTION INTRAMUSCULAR; INTRAVENOUS AS NEEDED
Status: CANCELLED | OUTPATIENT
Start: 2020-01-01

## 2020-01-01 RX ORDER — ONDANSETRON HYDROCHLORIDE 8 MG/1
8 TABLET, FILM COATED ORAL 3 TIMES DAILY PRN
Qty: 30 TABLET | Refills: 5 | Status: SHIPPED | OUTPATIENT
Start: 2020-01-01

## 2020-01-01 RX ORDER — LIDOCAINE AND PRILOCAINE 25; 25 MG/G; MG/G
CREAM TOPICAL
Qty: 30 G | Refills: 5 | Status: SHIPPED | OUTPATIENT
Start: 2020-01-01

## 2020-01-01 RX ORDER — MONTELUKAST SODIUM 10 MG/1
10 TABLET ORAL NIGHTLY
COMMUNITY

## 2020-01-01 RX ORDER — HYDROCODONE BITARTRATE AND ACETAMINOPHEN 5; 325 MG/1; MG/1
1 TABLET ORAL EVERY 4 HOURS PRN
COMMUNITY
End: 2020-01-01

## 2020-01-01 RX ORDER — ALBUTEROL SULFATE 90 UG/1
4 AEROSOL, METERED RESPIRATORY (INHALATION) ONCE
Status: COMPLETED | OUTPATIENT
Start: 2020-01-01 | End: 2020-01-01

## 2020-01-01 RX ORDER — PROPOFOL 10 MG/ML
VIAL (ML) INTRAVENOUS AS NEEDED
Status: DISCONTINUED | OUTPATIENT
Start: 2020-01-01 | End: 2020-01-01 | Stop reason: SURG

## 2020-01-01 RX ORDER — DOXORUBICIN HYDROCHLORIDE 2 MG/ML
50 INJECTION, SOLUTION INTRAVENOUS ONCE
Status: COMPLETED | OUTPATIENT
Start: 2020-01-01 | End: 2020-01-01

## 2020-01-01 RX ORDER — IPRATROPIUM BROMIDE AND ALBUTEROL SULFATE 2.5; .5 MG/3ML; MG/3ML
3 SOLUTION RESPIRATORY (INHALATION) ONCE AS NEEDED
Status: DISCONTINUED | OUTPATIENT
Start: 2020-01-01 | End: 2020-01-01 | Stop reason: HOSPADM

## 2020-01-01 RX ORDER — MIDAZOLAM HCL IN 0.9 % NACL/PF 1 MG/ML
1-10 PLASTIC BAG, INJECTION (ML) INTRAVENOUS
Status: DISCONTINUED | OUTPATIENT
Start: 2020-01-01 | End: 2020-01-01 | Stop reason: HOSPADM

## 2020-01-01 RX ORDER — HEPARIN SODIUM (PORCINE) LOCK FLUSH IV SOLN 100 UNIT/ML 100 UNIT/ML
500 SOLUTION INTRAVENOUS AS NEEDED
Status: CANCELLED | OUTPATIENT
Start: 2020-01-01

## 2020-01-01 RX ORDER — SODIUM CHLORIDE 9 MG/ML
125 INJECTION, SOLUTION INTRAVENOUS CONTINUOUS
Status: DISCONTINUED | OUTPATIENT
Start: 2020-01-01 | End: 2020-01-01 | Stop reason: HOSPADM

## 2020-01-01 RX ORDER — ASPIRIN 81 MG/1
81 TABLET ORAL DAILY
COMMUNITY

## 2020-01-01 RX ORDER — UBIDECARENONE 75 MG
250 CAPSULE ORAL DAILY
COMMUNITY

## 2020-01-01 RX ORDER — FENTANYL CITRATE 50 UG/ML
INJECTION, SOLUTION INTRAMUSCULAR; INTRAVENOUS AS NEEDED
Status: DISCONTINUED | OUTPATIENT
Start: 2020-01-01 | End: 2020-01-01 | Stop reason: SURG

## 2020-01-01 RX ORDER — PROPOFOL 10 MG/ML
VIAL (ML) INTRAVENOUS
Status: COMPLETED
Start: 2020-01-01 | End: 2020-01-01

## 2020-01-01 RX ORDER — MEPERIDINE HYDROCHLORIDE 25 MG/ML
12.5 INJECTION INTRAMUSCULAR; INTRAVENOUS; SUBCUTANEOUS
Status: DISCONTINUED | OUTPATIENT
Start: 2020-01-01 | End: 2020-01-01 | Stop reason: HOSPADM

## 2020-01-01 RX ORDER — HEPARIN SODIUM (PORCINE) LOCK FLUSH IV SOLN 100 UNIT/ML 100 UNIT/ML
SOLUTION INTRAVENOUS
Status: COMPLETED
Start: 2020-01-01 | End: 2020-01-01

## 2020-01-01 RX ADMIN — VINCRISTINE SULFATE 2 MG: 1 INJECTION, SOLUTION INTRAVENOUS at 14:52

## 2020-01-01 RX ADMIN — SODIUM CHLORIDE 150 MG: 9 INJECTION, SOLUTION INTRAVENOUS at 10:00

## 2020-01-01 RX ADMIN — IOVERSOL 100 ML: 741 INJECTION INTRA-ARTERIAL; INTRAVENOUS at 13:38

## 2020-01-01 RX ADMIN — PALONOSETRON HYDROCHLORIDE 0.25 MG: 0.25 INJECTION INTRAVENOUS at 13:15

## 2020-01-01 RX ADMIN — FAMOTIDINE 20 MG: 10 INJECTION INTRAVENOUS at 08:12

## 2020-01-01 RX ADMIN — SODIUM CHLORIDE 1000 ML: 9 INJECTION, SOLUTION INTRAVENOUS at 11:47

## 2020-01-01 RX ADMIN — DEXAMETHASONE SODIUM PHOSPHATE 12 MG: 4 INJECTION, SOLUTION INTRAMUSCULAR; INTRAVENOUS at 13:17

## 2020-01-01 RX ADMIN — SODIUM CHLORIDE 500 ML: 9 INJECTION, SOLUTION INTRAVENOUS at 18:13

## 2020-01-01 RX ADMIN — PROPOFOL 30 MG: 10 INJECTION, EMULSION INTRAVENOUS at 08:12

## 2020-01-01 RX ADMIN — CYCLOPHOSPHAMIDE 1490 MG: 1 INJECTION, POWDER, FOR SOLUTION INTRAVENOUS; ORAL at 15:05

## 2020-01-01 RX ADMIN — SODIUM CHLORIDE 250 ML: 9 INJECTION, SOLUTION INTRAVENOUS at 09:35

## 2020-01-01 RX ADMIN — DOXORUBICIN HYDROCHLORIDE 100 MG: 2 INJECTION, SOLUTION INTRAVENOUS at 14:40

## 2020-01-01 RX ADMIN — ALBUTEROL SULFATE 6 PUFF: 90 AEROSOL, METERED RESPIRATORY (INHALATION) at 12:04

## 2020-01-01 RX ADMIN — RITUXIMAB 750 MG: 10 INJECTION, SOLUTION INTRAVENOUS at 10:10

## 2020-01-01 RX ADMIN — ACETAMINOPHEN 650 MG: 325 TABLET ORAL at 09:30

## 2020-01-01 RX ADMIN — SUCCINYLCHOLINE CHLORIDE 120 MG: 20 INJECTION, SOLUTION INTRAMUSCULAR; INTRAVENOUS at 16:17

## 2020-01-01 RX ADMIN — VANCOMYCIN HYDROCHLORIDE 1500 MG: 10 INJECTION, POWDER, LYOPHILIZED, FOR SOLUTION INTRAVENOUS at 13:10

## 2020-01-01 RX ADMIN — LIDOCAINE HYDROCHLORIDE 40 MG: 20 INJECTION, SOLUTION INFILTRATION; PERINEURAL at 07:52

## 2020-01-01 RX ADMIN — VECURONIUM BROMIDE 7.5 MG: 1 INJECTION, POWDER, LYOPHILIZED, FOR SOLUTION INTRAVENOUS at 16:39

## 2020-01-01 RX ADMIN — SODIUM CHLORIDE, POTASSIUM CHLORIDE, SODIUM LACTATE AND CALCIUM CHLORIDE: 600; 310; 30; 20 INJECTION, SOLUTION INTRAVENOUS at 07:46

## 2020-01-01 RX ADMIN — DEXAMETHASONE SODIUM PHOSPHATE 12 MG: 4 INJECTION, SOLUTION INTRA-ARTICULAR; INTRALESIONAL; INTRAMUSCULAR; INTRAVENOUS; SOFT TISSUE at 09:45

## 2020-01-01 RX ADMIN — PEGFILGRASTIM 6 MG: KIT SUBCUTANEOUS at 15:42

## 2020-01-01 RX ADMIN — HEPARIN SODIUM (PORCINE) LOCK FLUSH IV SOLN 100 UNIT/ML 500 UNITS: 100 SOLUTION at 15:45

## 2020-01-01 RX ADMIN — FLUDEOXYGLUCOSE F18 1 DOSE: 300 INJECTION INTRAVENOUS at 11:10

## 2020-01-01 RX ADMIN — ETOMIDATE 24 MG: 2 INJECTION, SOLUTION INTRAVENOUS at 16:16

## 2020-01-01 RX ADMIN — SODIUM CHLORIDE, POTASSIUM CHLORIDE, SODIUM LACTATE AND CALCIUM CHLORIDE 125 ML/HR: 600; 310; 30; 20 INJECTION, SOLUTION INTRAVENOUS at 07:29

## 2020-01-01 RX ADMIN — SODIUM CHLORIDE, PRESERVATIVE FREE 10 ML: 5 INJECTION INTRAVENOUS at 15:39

## 2020-01-01 RX ADMIN — SODIUM CHLORIDE 125 ML/HR: 9 INJECTION, SOLUTION INTRAVENOUS at 13:10

## 2020-01-01 RX ADMIN — CEFAZOLIN SODIUM 2 G: 2 SOLUTION INTRAVENOUS at 07:46

## 2020-01-01 RX ADMIN — ONDANSETRON 4 MG: 2 INJECTION INTRAMUSCULAR; INTRAVENOUS at 08:12

## 2020-01-01 RX ADMIN — FENTANYL CITRATE 50 MCG: 50 INJECTION INTRAMUSCULAR; INTRAVENOUS at 07:46

## 2020-01-01 RX ADMIN — VINCRISTINE SULFATE 2 MG: 1 INJECTION, SOLUTION INTRAVENOUS at 14:37

## 2020-01-01 RX ADMIN — PIPERACILLIN AND TAZOBACTAM 4.5 G: 4; .5 INJECTION, POWDER, LYOPHILIZED, FOR SOLUTION INTRAVENOUS; PARENTERAL at 11:59

## 2020-01-01 RX ADMIN — ALBUTEROL SULFATE 6 PUFF: 90 AEROSOL, METERED RESPIRATORY (INHALATION) at 13:55

## 2020-01-01 RX ADMIN — PEGFILGRASTIM 6 MG: KIT SUBCUTANEOUS at 15:46

## 2020-01-01 RX ADMIN — DIPHENHYDRAMINE HYDROCHLORIDE 50 MG: 50 INJECTION, SOLUTION INTRAMUSCULAR; INTRAVENOUS at 09:30

## 2020-01-01 RX ADMIN — SODIUM CHLORIDE 250 ML: 9 INJECTION, SOLUTION INTRAVENOUS at 09:30

## 2020-01-01 RX ADMIN — Medication 1 MG/HR: at 18:10

## 2020-01-01 RX ADMIN — MIDAZOLAM HYDROCHLORIDE 1 MG: 1 INJECTION, SOLUTION INTRAMUSCULAR; INTRAVENOUS at 07:46

## 2020-01-01 RX ADMIN — SODIUM CHLORIDE, PRESERVATIVE FREE 500 UNITS: 5 INJECTION INTRAVENOUS at 15:45

## 2020-01-01 RX ADMIN — CYCLOPHOSPHAMIDE 1460 MG: 1 INJECTION, POWDER, FOR SOLUTION INTRAVENOUS; ORAL at 14:53

## 2020-01-01 RX ADMIN — SODIUM CHLORIDE 150 MG: 9 INJECTION, SOLUTION INTRAVENOUS at 13:32

## 2020-01-01 RX ADMIN — PALONOSETRON HYDROCHLORIDE 0.25 MG: 0.25 INJECTION INTRAVENOUS at 14:20

## 2020-01-01 RX ADMIN — ACETAMINOPHEN 650 MG: 325 TABLET ORAL at 09:43

## 2020-01-01 RX ADMIN — MIDAZOLAM HYDROCHLORIDE 2 MG: 1 INJECTION, SOLUTION INTRAMUSCULAR; INTRAVENOUS at 17:45

## 2020-01-01 RX ADMIN — PROPOFOL 40 MG: 10 INJECTION, EMULSION INTRAVENOUS at 07:52

## 2020-01-01 RX ADMIN — DOXORUBICIN HYDROCHLORIDE 100 MG: 2 INJECTION, SOLUTION INTRAVENOUS at 14:20

## 2020-01-01 RX ADMIN — DIPHENHYDRAMINE HYDROCHLORIDE 50 MG: 50 INJECTION, SOLUTION INTRAMUSCULAR; INTRAVENOUS at 09:35

## 2020-01-01 RX ADMIN — RITUXIMAB 750 MG: 10 INJECTION, SOLUTION INTRAVENOUS at 10:35

## 2020-01-01 RX ADMIN — ALBUTEROL SULFATE 4 PUFF: 90 AEROSOL, METERED RESPIRATORY (INHALATION) at 11:13

## 2020-01-01 RX ADMIN — Medication 500 UNITS: at 15:40

## 2020-01-01 RX ADMIN — PROPOFOL 5 MCG/KG/MIN: 10 INJECTION, EMULSION INTRAVENOUS at 16:27

## 2020-01-01 RX ADMIN — GADOBENATE DIMEGLUMINE 16 ML: 529 INJECTION, SOLUTION INTRAVENOUS at 14:05

## 2020-01-27 NOTE — PROGRESS NOTES
Chief Complaint   Patient presents with   • Follow-up     Cardiac management.   • Lab     Last labs per PCP in the last 6 months.   • Edema     Having swelling to lower legs with redness, notice more swelling to right lower leg. He reports Dr Haddad obtained MRI 2 months ago.    • Med Refill     Needs refills on Imdur-90 day.       Subjective       Salo Moctezuma is a 74 y.o. male with HTN, hyperlipidemia and hypothyroidism referred for evaluation of palpitations and chest tightness in 2017. Holter showed PAC treated with beta blockers. Nuclear stress was done after he failed to achieve THR on regular stress which showed small inferolateral ischemia. Imdur added and chest tightness subsided. He underwent nocturnal oximetry which was abnormal. Referred for sleep study. He came today for follow up with his wife. Chest pain and palpitations remain improved. His main concern today is lower extremity edema which started few weeks ago. He has nodular densities to lower legs, one nodule to left upper arm and right wrist. The nodules are firm, mobile, and non-tender. Associated redness to lower leg lesions. He has significant swelling to right lower leg. He apparently has seen a general surgeon and underwent MRI of leg about two months ago. He reports MRI was done prior to the swelling. There has been discussed about biopsy but felt to be some kind of subcutaneous fibroma? Patient and wife were unsure of the diagnosis. Labs followed by PCP.   HPI         Cardiac History:    Past Surgical History:   Procedure Laterality Date   • CARDIOVASCULAR STRESS TEST  11/30/2017    L. Myoview- Small Inferolateral Ischemia   • ECHO - CONVERTED  08/23/2017    EF 60-65%, mild MR, RVSP 41 mmHg   • EXERCISE STRESS - CONVERTED  08/23/2017    R.Stress- 6 min, 76% THR, 172/75, frequent PACs, inconclusive    • OTHER SURGICAL HISTORY  09/10/2017    Event Monitor- baseline sinus,brief atrial run, lowest HR 50 highest 112,        Current Outpatient  Medications   Medication Sig Dispense Refill   • aspirin 81 MG EC tablet Take 81 mg by mouth Daily.     • atorvastatin (LIPITOR) 10 MG tablet Take 10 mg by mouth Every Night.     • isosorbide mononitrate (IMDUR) 30 MG 24 hr tablet Take 1 tablet by mouth Daily. 90 tablet 3   • levothyroxine (SYNTHROID, LEVOTHROID) 50 MCG tablet Take 50 mcg by mouth Daily.     • propranolol LA (INDERAL LA) 120 MG 24 hr capsule Take 120 mg by mouth Daily.       No current facility-administered medications for this visit.        Sulfa antibiotics    Past Medical History:   Diagnosis Date   • History of cholecystectomy    • Hyperlipidemia    • Hypertension    • Hypothyroidism    • Mitral valve prolapse        Social History     Socioeconomic History   • Marital status:      Spouse name: Not on file   • Number of children: Not on file   • Years of education: Not on file   • Highest education level: Not on file   Tobacco Use   • Smoking status: Never Smoker   • Smokeless tobacco: Current User     Types: Chew   Substance and Sexual Activity   • Alcohol use: Not Currently     Comment: used to drink beer and wine but no in the past 2 years   • Drug use: No       Family History   Problem Relation Age of Onset   • Heart attack Father    • Heart disease Brother        Review of Systems   Constitution: Positive for weight gain (up 8 lb). Negative for decreased appetite and malaise/fatigue.   HENT: Negative.    Eyes: Negative.    Cardiovascular: Positive for leg swelling. Negative for chest pain, dyspnea on exertion, palpitations (improved) and syncope.   Respiratory: Negative for cough and shortness of breath.    Endocrine: Negative.    Hematologic/Lymphatic: Negative.    Skin: Positive for suspicious lesions.   Gastrointestinal: Negative for abdominal pain and melena.   Genitourinary: Negative for hematuria.   Neurological: Negative for dizziness.   Psychiatric/Behavioral: Negative for altered mental status and depression.    "  Allergic/Immunologic: Negative.       Diabetes- No  Thyroid-normal    Objective     /60 (BP Location: Right arm)   Pulse 60   Ht 175.3 cm (69.02\")   Wt 82.1 kg (181 lb)   BMI 26.72 kg/m²     Physical Exam   Constitutional: He is oriented to person, place, and time. He appears well-developed and well-nourished. No distress.   HENT:   Head: Normocephalic.   Eyes: Pupils are equal, round, and reactive to light.   Neck: Normal range of motion.   Cardiovascular: Normal rate, regular rhythm and intact distal pulses.   Pulmonary/Chest: Effort normal and breath sounds normal. No respiratory distress. He has no wheezes.   Abdominal: Soft. Bowel sounds are normal. He exhibits no distension.   Musculoskeletal: He exhibits edema (2+ RLE, 1+ LLE ).   Neurological: He is alert and oriented to person, place, and time.   Skin: Skin is warm and dry. He is not diaphoretic.        Nodular densities   Psychiatric: He has a normal mood and affect.   Nursing note and vitals reviewed.     Procedures          Assessment/Plan      Salo was seen today for follow-up, lab, edema and med refill.    Diagnoses and all orders for this visit:    Edema leg  -     Duplex Venous Lower Extremity - Bilateral CAR; Future    PAC (premature atrial contraction)    Palpitations    Hypercholesteremia    Essential hypertension    Abnormal stress test  -     isosorbide mononitrate (IMDUR) 30 MG 24 hr tablet; Take 1 tablet by mouth Daily.      Heart rate and blood pressure very well controlled. Palpitations well controlled with propranolol. Continue the same. Lipids managed with low dose Lipitor. He tolerates well. Labs followed by PCP. Stress test showed small area of ischemia managed medically with long-acting nitrates. He has no further chest tightness, so continue the same. Refills sent. Regarding the skin lesions and leg swelling, his is advised to undergo venous doppler US to rule out DVT. He is strongly advised to see surgeon again or request " referral to dermatology for evaluation as he does admit to worsening skin nodules in the recent few weeks since last evaluation. Further recommendations to follow the US. We will see him back in six months or sooner if needed.     Patient's Body mass index is 26.72 kg/m². BMI is above normal parameters. Recommendations include: nutrition counseling.               Electronically signed by JULIAN Brice,  January 29, 2020 8:06 PM

## 2020-02-12 NOTE — TELEPHONE ENCOUNTER
Received fax from Dr. Vitaliy Haddad for cardiac clearance for patient to have an excision of left and right upper extremity skin lesions. Patient is on aspirin and they are requesting to hold for 5 days prior to surgery. According to our records, I do not see where patient has had any stenting.       Fax [phone]

## 2020-03-10 PROBLEM — C83.30 DIFFUSE LARGE B CELL LYMPHOMA (HCC): Status: ACTIVE | Noted: 2020-01-01

## 2020-03-10 NOTE — PROGRESS NOTES
"  Name:  Salo Moctezuma  :  1945  Date:  3/10/2020     REFERRING PHYSICIAN  Josue Haddad MD    PRIMARY CARE PROVIDER  Huey Lewis APRN    REASON FOR CONSULTATION  1. Diffuse large B-cell lymphoma of extranodal site excluding spleen and other solid organs (CMS/HCC)    2. Encounter for screening for other viral diseases     3. Encounter for antineoplastic chemotherapy    4. Encounter for antineoplastic chemotherapy       CHIEF COMPLAINT  Multiple skin lesions, particularly on the lower legs (and status post excisional biopsies of two of the lesions, one on each arm).    Dear Dr. Haddad,    HISTORY OF PRESENT ILLNESS:   Thank you for referring Mr. Moctezuma to our hematology/oncology clinic. As you are aware, he is a pleasant, 74 y.o., white male with a history of hypertension and hypothyroidism who first started noticing some \"blotchy\" spots on his arms and legs several months ago, in ~2019 (actually probably longer ago than that, according to his daughter-in-law). As these spots waxed and waned for a while and were not causing any significant problems, they were routinely monitored for a while. However, by 2020, several of them had increased in size to the point that they were more concerning; and you performed excisional biopsies on two of them (one on the right, anterior forearm and one on the left, posterior upper arm). Surprisingly, the pathology results on both specimens were consistent with large B-cell lymphoma. He is now referred to our clinic for further evaluation and management.    INTERIM HISTORY:  Mr. Moctezuma presents to clinic today for initial consultation accompanied by his daughter-in-law. They confirm the above history. His daughter-in-law states that the patient has occasionally been hearing voices recently. He points out multiple spots on his skin, primarily covering the bilateral, posterior, distal lower extremities onl), which started and currently look very similarly to " the two lesions that were excised late last month. Other than being cosmetically annoying, they are not causing him any specific symptoms (pruritus, etc.). He has no specific complaints and feels to be in his usual state of health.    Past Medical History:   Diagnosis Date   • Heart disease    • History of cholecystectomy    • Hyperlipidemia    • Hypertension    • Hypothyroidism    • Lymphoma (CMS/HCC)    • Mitral valve prolapse    • Sleep apnea        Past Surgical History:   Procedure Laterality Date   • CARDIOVASCULAR STRESS TEST  11/30/2017    L. Myoview- Small Inferolateral Ischemia   • ECHO - CONVERTED  08/23/2017    EF 60-65%, mild MR, RVSP 41 mmHg   • EXERCISE STRESS - CONVERTED  08/23/2017    R.Stress- 6 min, 76% THR, 172/75, frequent PACs, inconclusive    • OTHER SURGICAL HISTORY  09/10/2017    Event Monitor- baseline sinus,brief atrial run, lowest HR 50 highest 112,        Social History     Socioeconomic History   • Marital status:      Spouse name: Not on file   • Number of children: Not on file   • Years of education: Not on file   • Highest education level: Not on file   Tobacco Use   • Smoking status: Never Smoker   • Smokeless tobacco: Current User     Types: Chew   Substance and Sexual Activity   • Alcohol use: Not Currently     Comment: used to drink beer and wine but no in the past 2 years   • Drug use: No   • Sexual activity: Defer       Family History   Problem Relation Age of Onset   • Heart attack Father    • Colon cancer Sister    • Heart disease Brother        Allergies   Allergen Reactions   • Sulfa Antibiotics Other (See Comments)     yeast       Current Outpatient Medications   Medication Sig Dispense Refill   • aspirin 81 MG EC tablet Take 81 mg by mouth Daily.     • atorvastatin (LIPITOR) 10 MG tablet Take 10 mg by mouth Every Night.     • cetirizine (zyrTEC) 10 MG tablet Take 10 mg by mouth Daily.     • cholecalciferol (VITAMIN D3) 25 MCG (1000 UT) tablet Take 1,000 Units by  "mouth Daily.     • HYDROcodone-acetaminophen (NORCO) 5-325 MG per tablet Take 1 tablet by mouth Every 4 (Four) Hours As Needed.     • isosorbide mononitrate (IMDUR) 30 MG 24 hr tablet Take 1 tablet by mouth Daily. 90 tablet 3   • levothyroxine (SYNTHROID, LEVOTHROID) 50 MCG tablet Take 50 mcg by mouth Daily.     • montelukast (SINGULAIR) 10 MG tablet Take 10 mg by mouth Every Night.     • predniSONE (DELTASONE) 10 MG tablet Take 10 mg by mouth Take As Directed.     • propranolol LA (INDERAL LA) 120 MG 24 hr capsule Take 120 mg by mouth Daily.       No current facility-administered medications for this visit.      REVIEW OF SYSTEMS  CONSTITUTIONAL:  No fever, chills or night sweats.  EYES:  No blurry vision, diplopia or other vision changes.  ENT:  No hearing loss, nosebleeds or sore throat.  CARDIOVASCULAR:  No palpitations, arrhythmia, syncopal episodes or edema.  PULMONARY:  No hemoptysis, wheezing, chronic cough or shortness of breath.  GASTROINTESTINAL:  No nausea or vomiting.  No constipation or diarrhea.  No abdominal pain.  GENITOURINARY:  No hematuria, kidney stones or frequent urination.  MUSCULOSKELETAL:  No joint or back pains.  INTEGUMENTARY: As per the HPI above.  ENDOCRINE:  No excessive thirst or hot flashes.  HEMATOLOGIC:  No history of free bleeding, spontaneous bleeding or clotting.  IMMUNOLOGIC:  No allergies or frequent infections.  NEUROLOGIC: No numbness, tingling, seizures or weakness. Reportedly occasionally hears voices, per the patient's daughter-in-law.  PSYCHIATRIC:  No anxiety or depression.    PHYSICAL EXAMINATION  /78   Pulse 91   Temp 97.7 °F (36.5 °C) (Oral)   Resp 18   Ht 175.3 cm (69\")   Wt 82.3 kg (181 lb 6.4 oz)   SpO2 98%   BMI 26.79 kg/m²     Pain Score:  Pain Score    03/10/20 0857   PainSc: 0-No pain     PHQ-Score Total:  PHQ-9 Total Score: 5    GENERAL:  A well-developed, well-nourished, elderly, white male in no acute distress.  HEENT:  Pupils equally round and " reactive to light.  Extraocular muscles intact.  CARDIOVASCULAR:  Regular rate and rhythm.  No murmurs, gallops or rubs.  LUNGS:  Clear to auscultation bilaterally.  ABDOMEN:  Soft, nontender, nondistended with positive bowel sounds.  EXTREMITIES:  No clubbing, cyanosis or edema bilaterally.  SKIN:  Multiple, erythematous, bulbous lesions ~1 cm in size or less each, involving the posterior, distal bilateral lower extremities (R>L). A couple of isolated, similar lesions on the bilateral upper thighs. Well-healing excisional scars on the right forearm and left posterior upper arm.  NEURO:  Cranial nerves grossly intact.  No focal deficits.  PSYCH:  Alert and oriented x3.    LABORATORY  No results found for: WBC, HGB, HCT, MCV, PLT, NEUTROABS    No results found for: NA, K, CL, CO2, BUN, CREATININE, GLUCOSE, CALCIUM, AST, ALT, ALKPHOS, BILITOT, PROTEINTOT, ALBUMIN    IMAGING    PATHOLOGY  Skin, left upper extremity lesion, excisional biopsy (02/21/2020): Diffuse large B-cell lymphoma, germinal center type.    Skin, right distal upper extremity lesion (02/21/2020):  Diffuse large B-cell lymphoma, germinal center type.    IMPRESSION AND PLAN  Mr. Moctezuma is a 74 y.o., white male with:  1. Large B-cell lymphoma: Surprisingly diagnosed in late February 2020 with cutaneous involvement by this disease when the results of excisional biopsies on two, upper extremity skin lesions resulted. Similar lesions continue to wax and wane on his lower extremities (particularly on his calves). I had a long discussion with the patient and his daughter-in-law in clinic today regarding this diagnosis and, in, general, its prognosis. In short, while this is an unusual presentation of a fairly common form of lymphoma, it is typically treatable, and potentially curable, with systemic chemotherapy. We will obtain an initial staging NM PET scan and baseline echocardiogram within the week and see him back in our clinic next week to finalize our  "treatment recommendations. In between now and then, he and his daughter-in-law may go ahead and schedule an initial evaluation with a local surgeon for powerport placement (which will all but certainly be needed for his therapy); however, as they are currently having to help care for the patient's wife (who has recently started to undergo an evaluation for new onset colorectal cancer), they admit that they cannot reliably make too many appointments so close together. We will refer him at the time of his followup appointment next week, if they have not already done so.  2. Altered mental status: Although the patient seemed completely appropriate (and oriented) in clinic today, his daughter-in-law stated that she is fairly certain that he has been \"hearing voices\" for at least the past several months. We will obtain an MRI of the brain with and without contrast between now and his followup appointment next week.  The patient and his daughter-in-law were in agreement with these plans.    It is a pleasure to participate in Mr. Moctezuma's care. Please do not hesitate to call with any questions or concerns that you may have.    A total of 60 minutes were spent coordinating this patient’s care in clinic today; more than 50% of this time was face-to-face with the patient and his daughter-in-law, reviewing his medical history, discussing the diagnosis and its prognosis and counseling on the current evaluation and followup plan. All questions were answered to their satisfaction.    FOLLOW UP  Return to clinic in ~1.5 weeks with a CBC, CMP, LDH, uric acid, hepatitis panel (labs drawn today), baseline echocardiogram, NM PET scan and MRI of the brain with and without contrast performed between now and then.          This document was electronically signed by SANJEEV Denny MD March 10, 2020 09:44      CC: MD Huey Schumacher APRN  "

## 2020-03-12 NOTE — PROGRESS NOTES
SS spoke with patient and spouse Eunice this date.  Both patient and spouse will be patient's here in oncology.  Patient and spouse live in the [place] and state that they spoke with Michelle Atkins with [employer].  Michelle informed patient and spouse to contact  regarding gas cards.  SS explained to patient that gas cards are very limited and are not available for the duration of the treatments they will be receiving.    SS contacted Michellenabeel Atkins [phone].  SS explained to Michelle that no resources available in patient's home area for gas cards.  Michelle states that she knows family personally and will assist them with gas cards.  SS will follow as needed.

## 2020-03-18 PROBLEM — C83.39 DIFFUSE LARGE B-CELL LYMPHOMA OF EXTRANODAL SITE EXCLUDING SPLEEN AND OTHER SOLID ORGANS (HCC): Status: ACTIVE | Noted: 2020-01-01

## 2020-03-18 NOTE — ACP (ADVANCE CARE PLANNING)
Salo Moctezuma does not have an advance care plan and is not interested in making one at this time.  We discussed advance care planning and would be happy to assist with this at any time..  .

## 2020-03-18 NOTE — PROGRESS NOTES
"  Name:  Salo Moctezuma  :  1945  Date:  3/18/2020     REFERRING PHYSICIAN  Josue Haddad MD    PRIMARY CARE PROVIDER  Huey Lewis APRN    REASON FOR FOLLOWUP  1. Diffuse large B-cell lymphoma of extranodal site excluding spleen and other solid organs (CMS/HCC)      CHIEF COMPLAINT  Multiple skin lesions, particularly on the lower legs (and status post excisional biopsies of two of the lesions, one on each arm).    Dear Dr. Haddad,    HISTORY OF PRESENT ILLNESS:   I saw Mr. Moctezuma in follow up today in our hematology/oncology clinic. As you are aware, he is a pleasant, 74 y.o., white male with a history of hypertension and hypothyroidism who first started noticing some \"blotchy\" spots on his arms and legs several months ago, in ~2019 (actually probably longer ago than that, according to his daughter-in-law). As these spots waxed and waned for a while and were not causing any significant problems, they were routinely monitored for a while. However, by 2020, several of them had increased in size to the point that they were more concerning; and you performed excisional biopsies on two of them (one on the right, anterior forearm and one on the left, posterior upper arm). Unexpectedly, the pathology results on both specimens were consistent with large B-cell lymphoma. He was subsequently referred to our clinic for further evaluation and management. At the time of his initial appointment with us (on 03/10/2020), he was agreeable to undergoing an initial staging NM PET scan as well as an echocardiogram and powerport placement in anticipation of starting systemic chemotherapy soon.    INTERIM HISTORY:  Mr. Moctezuma returns to clinic today for follow up accompanied by his wife. They confirm the above history.The patient's family remains convinced that the patient occasionally hears voices, particularly lately; however, he otherwise has no specific neurological complaints and responds to questions " appropriately. He again points out multiple spots on his skin, primarily covering the bilateral, posterior, distal lower extremities onl), which started and currently look very similarly to the two lesions that were excised in late February 2020. Other than being cosmetically annoying, they are still not causing him any specific symptoms (pruritus, etc.). He again has no other specific complaints and continues to feel to be in his usual state of health.    Past Medical History:   Diagnosis Date   • Heart disease    • History of cholecystectomy    • Hyperlipidemia    • Hypertension    • Hypothyroidism    • Lymphoma (CMS/HCC)    • Mitral valve prolapse    • Sleep apnea        Past Surgical History:   Procedure Laterality Date   • CARDIOVASCULAR STRESS TEST  11/30/2017    L. Myoview- Small Inferolateral Ischemia   • ECHO - CONVERTED  08/23/2017    EF 60-65%, mild MR, RVSP 41 mmHg   • EXERCISE STRESS - CONVERTED  08/23/2017    R.Stress- 6 min, 76% THR, 172/75, frequent PACs, inconclusive    • OTHER SURGICAL HISTORY  09/10/2017    Event Monitor- baseline sinus,brief atrial run, lowest HR 50 highest 112,        Social History     Socioeconomic History   • Marital status:      Spouse name: Not on file   • Number of children: Not on file   • Years of education: Not on file   • Highest education level: Not on file   Tobacco Use   • Smoking status: Never Smoker   • Smokeless tobacco: Current User     Types: Chew   Substance and Sexual Activity   • Alcohol use: Not Currently     Comment: used to drink beer and wine but no in the past 2 years   • Drug use: No   • Sexual activity: Defer       Family History   Problem Relation Age of Onset   • Heart attack Father    • Colon cancer Sister    • Heart disease Brother        Allergies   Allergen Reactions   • Sulfa Antibiotics Other (See Comments)     yeast       Current Outpatient Medications   Medication Sig Dispense Refill   • allopurinol (ZYLOPRIM) 300 MG tablet Take 1  tablet by mouth Daily. 30 tablet 3   • aspirin 81 MG EC tablet Take 81 mg by mouth Daily.     • atorvastatin (LIPITOR) 10 MG tablet Take 10 mg by mouth Every Night.     • cetirizine (zyrTEC) 10 MG tablet Take 10 mg by mouth Daily.     • cholecalciferol (VITAMIN D3) 25 MCG (1000 UT) tablet Take 1,000 Units by mouth Daily.     • isosorbide mononitrate (IMDUR) 30 MG 24 hr tablet Take 1 tablet by mouth Daily. 90 tablet 3   • levothyroxine (SYNTHROID, LEVOTHROID) 50 MCG tablet Take 50 mcg by mouth Daily.     • montelukast (SINGULAIR) 10 MG tablet Take 10 mg by mouth Every Night.     • propranolol LA (INDERAL LA) 120 MG 24 hr capsule Take 120 mg by mouth Daily.       No current facility-administered medications for this visit.      REVIEW OF SYSTEMS  CONSTITUTIONAL:  No fever, chills or night sweats.  EYES:  No blurry vision, diplopia or other vision changes.  ENT:  No hearing loss, nosebleeds or sore throat.  CARDIOVASCULAR:  No palpitations, arrhythmia, syncopal episodes or edema.  PULMONARY:  No hemoptysis, wheezing, chronic cough or shortness of breath.  GASTROINTESTINAL:  No nausea or vomiting.  No constipation or diarrhea.  No abdominal pain.  GENITOURINARY:  No hematuria, kidney stones or frequent urination.  MUSCULOSKELETAL:  No joint or back pains.  INTEGUMENTARY: As per the HPI above.  ENDOCRINE:  No excessive thirst or hot flashes.  HEMATOLOGIC:  No history of free bleeding, spontaneous bleeding or clotting.  IMMUNOLOGIC:  No allergies or frequent infections.  NEUROLOGIC: No numbness, tingling, seizures or weakness. Reportedly occasionally hears voices, per the patient's daughter-in-law.  PSYCHIATRIC:  No anxiety or depression.    PHYSICAL EXAMINATION  /73   Pulse 70   Temp 97 °F (36.1 °C) (Oral)   Resp 18   Wt 83 kg (183 lb)   SpO2 95%   BMI 27.01 kg/m²     Pain Score:  Pain Score    03/18/20 1308   PainSc: 0-No pain       PHQ-Score Total:  PHQ-9 Total Score:      GENERAL:  A well-developed,  well-nourished, elderly, white male in no acute distress.  HEENT:  Pupils equally round and reactive to light.  Extraocular muscles intact.  CARDIOVASCULAR:  Regular rate and rhythm.  No murmurs, gallops or rubs.  LUNGS:  Clear to auscultation bilaterally.  ABDOMEN:  Soft, nontender, nondistended with positive bowel sounds.  EXTREMITIES:  No clubbing, cyanosis or edema bilaterally.  SKIN:  Multiple, erythematous, bulbous lesions ~1 cm in size or less each, involving the posterior, distal bilateral lower extremities (R>L). A couple of isolated, similar lesions on the bilateral upper thighs. Well-healing excisional scars on the right forearm and left posterior upper arm.  NEURO:  Cranial nerves grossly intact.  No focal deficits.  PSYCH:  Alert and oriented x3.    The physical exam is unchanged from previous.    LABORATORY  Lab Results   Component Value Date    WBC 9.75 03/10/2020    HGB 15.2 03/10/2020    HCT 48.4 03/10/2020    MCV 91.8 03/10/2020     03/10/2020    NEUTROABS 6.83 03/10/2020       Lab Results   Component Value Date     03/10/2020    K 4.2 03/10/2020     03/10/2020    CO2 29.1 (H) 03/10/2020    BUN 20 03/10/2020    CREATININE 0.84 03/10/2020    GLUCOSE 117 (H) 03/10/2020    CALCIUM 9.5 03/10/2020    AST 20 03/10/2020    ALT 22 03/10/2020    ALKPHOS 109 03/10/2020    BILITOT 0.6 03/10/2020    PROTEINTOT 7.4 03/10/2020    ALBUMIN 3.95 03/10/2020     IMAGING  Echocardiogram (03/12/2020):  Impression: Normal left ventricular cavity size and wall thickness noted. All left ventricular wall segments contract normally. Estimated EF appears to be in the range of 61-65%.    MRI brain with and without contrast (03/13/2020):  Impression:  1) No evidence of metastatic disease to the brain.  2) Chronic microangiopathic changes.  3) No mass, hemorrhage or midline shift.    NM PET with fusion CT, skull base to mid-thigh (03/13/2020):  Impression: There is a solitary focal area of increased glucose  "uptake in the nasal cavity on the right where there is a 2 cm, fairly well-circumscribed nodule. There were some inflammatory changes in the maxillary sinus on the right that showed no hypermetabolic activity. No lymphadenopathy was demonstrated in the neck, chest, abdomen or pelvis.    PATHOLOGY  Skin, left upper extremity lesion, excisional biopsy (02/21/2020): Diffuse large B-cell lymphoma, germinal center type.    Skin, right distal upper extremity lesion (02/21/2020):  Diffuse large B-cell lymphoma, germinal center type.    IMPRESSION AND PLAN  Mr. Moctezuma is a 74 y.o., white male with:  1. Large B-cell lymphoma: Surprisingly diagnosed in late February 2020 with cutaneous involvement by this disease when the results of excisional biopsies on two, upper extremity skin lesions resulted. Similar lesions continue to wax and wane on his lower extremities (particularly on his shins and calves, R>L). A NM PET scan performed on 03/13/2020 for staging is summarized above; and, other than a nonspecific, \"fairly well-circumscribed, 2 cm nodule\" in the right nasal cavity (see below), was completely unremarkable. I had another long discussion with the patient (and, this time, his wife as well) today regarding this diagnosis and, in, general, its prognosis. In short, while this is an unusual presentation of a fairly common form of lymphoma, it is typically treatable, and potentially curable, with systemic chemotherapy. Although last week's NM PET scan was largely negative, with the apparent, diffuse cutaneous involvement (although none have been biopsied, multiple lesions are still present on his legs, arms and torso; and the majority of them are apparently acting identically to the ones that are biopsy confirmed), four to six cycles of [id number] R-CHOP are recommended. A baseline echocardiogram (perfomed on 03/12/2020) was within normal limits, and he is scheduled to have a powerport placed shortly. Once this is completed, we " "should be able to give the first cycle next week. A Rx for daily allopurinol was provided today. We will see him back in our clinic in one month, on day #1 of the second cycle of R-CHOP, with a CBC and CMP for a toxicity/symptom check.  2. Auditory hallucinations: Although the patient again seemed completely appropriate (and oriented) in clinic today, his wife and daughter-in-law remain certain that he has been \"hearing voices\" for at least the past several months. An MRI of the brain was performed on 03/13/2020 for further evaluation, and this showed findings consistent with microangiopathic changes only. While, CNS involvement by issue #1 is still certainly a risk; the yield of performing any additional testing at this time (such as a lumbar puncture) would seem to be low, and initiating therapy for his known, diffuse cutaneous disease is the current treatment priority. Continue to monitor.  3. Nasal cavity nodule: A \"solitary, focal area of increased glucose uptake in the nasal cavity on the right where there is a 2 cm, fairly well-circumscribed nodule\" along with some inflammatory changes were the only abnormal, hypermetabolic findings on the initial staging NM PET scan (performed on 03/13/2020 and summarized above). Both a focus of issue #1 and an unrelated, secondary malignancy are certainly both on the differential. As discussed above, systemic treatment for the known, diffuse cutaneous involvement by issue #1 is the current treatment priority and is scheduled to start next week. Close attention will be paid to this area in followup. If it does not respond to R-CHOP therapy, a referral to ENT will be warranted.  The patient and his wife were in agreement with these plans.    It is a pleasure to participate in Mr. Moctezuma's care. Please do not hesitate to call with any questions or concerns that you may have.    A total of 30 minutes were spent coordinating this patient’s care in clinic today; more than 50% of " this time was face-to-face with the patient and his wife, reviewing his interim medical history, discussing the results of last week's NM PET scan and MRI of the brain and counseling on the current treatment and followup plan. All questions were answered to their satisfaction.    FOLLOW UP  Rx for allopurinol 300 mg PO daily provided today. With local surgery for powerport placement, as previously planned. Begin [id number] R-CHOP next week. Return to our clinic in ~1 month, on day #1 of cycle #2 of R-CHOP, with a CBC and CMP.            This document was electronically signed by SANJEEV Denny MD March 18, 2020 15:29      CC: MD Bryan Schumacher MD Terry Lawson, APRN

## 2020-03-19 NOTE — PROGRESS NOTES
Subjective   Salo Moctezuma is a 74 y.o. male is being seen for consultation today at the request of Huey Lewis APRN    Salo Moctezuma is a 74 y.o. 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  The patient requires chemotherapy and will need long-term chemotherapy access.  No previous cervical incisions.  No anticoagulation.      Past Medical History:   Diagnosis Date   • Heart disease    • History of cholecystectomy    • Hyperlipidemia    • Hypertension    • Hypothyroidism    • Lymphoma (CMS/HCC)    • Mitral valve prolapse    • Sleep apnea        Family History   Problem Relation Age of Onset   • Heart attack Father    • Colon cancer Sister    • Heart disease Brother        Social History     Socioeconomic History   • Marital status:      Spouse name: Not on file   • Number of children: Not on file   • Years of education: Not on file   • Highest education level: Not on file   Tobacco Use   • Smoking status: Never Smoker   • Smokeless tobacco: Current User     Types: Chew   Substance and Sexual Activity   • Alcohol use: Not Currently     Comment: used to drink beer and wine but no in the past 2 years   • Drug use: No   • Sexual activity: Defer       Past Surgical History:   Procedure Laterality Date   • CARDIOVASCULAR STRESS TEST  11/30/2017    L. Myoview- Small Inferolateral Ischemia   • ECHO - CONVERTED  08/23/2017    EF 60-65%, mild MR, RVSP 41 mmHg   • EXERCISE STRESS - CONVERTED  08/23/2017    R.Stress- 6 min, 76% THR, 172/75, frequent PACs, inconclusive    • OTHER SURGICAL HISTORY  09/10/2017    Event Monitor- baseline sinus,brief atrial run, lowest HR 50 highest 112,        Review of Systems   Constitutional: Negative for activity change, appetite change, chills and fever.   HENT: Negative for sore throat and trouble swallowing.    Eyes: Negative for visual disturbance.   Respiratory: Negative for cough and shortness of breath.    Cardiovascular: Negative for chest pain  "and palpitations.   Gastrointestinal: Negative for abdominal distention, abdominal pain, blood in stool, constipation, diarrhea, nausea and vomiting.   Endocrine: Negative for cold intolerance and heat intolerance.   Genitourinary: Negative for dysuria.   Musculoskeletal: Negative for joint swelling.   Skin: Negative for color change, rash and wound.   Allergic/Immunologic: Negative for immunocompromised state.   Neurological: Negative for dizziness, seizures, weakness and headaches.   Hematological: Negative for adenopathy. Does not bruise/bleed easily.   Psychiatric/Behavioral: Negative for agitation and confusion.         /80   Temp 98.5 °F (36.9 °C)   Ht 175.3 cm (69.02\")   Wt 83 kg (183 lb)   BMI 27.01 kg/m²   Objective   Physical Exam   Constitutional: He is oriented to person, place, and time. He appears well-developed.   HENT:   Head: Normocephalic and atraumatic.   Mouth/Throat: Mucous membranes are normal.   Eyes: Pupils are equal, round, and reactive to light. Conjunctivae are normal.   Neck: Neck supple. No JVD present. No tracheal deviation present. No thyromegaly present.   Cardiovascular: Normal rate and regular rhythm. Exam reveals no gallop and no friction rub.   No murmur heard.  Pulmonary/Chest: Effort normal and breath sounds normal.   Abdominal: Soft. He exhibits no distension. There is no splenomegaly or hepatomegaly. There is no tenderness. No hernia.   Musculoskeletal: Normal range of motion. He exhibits no deformity.   Neurological: He is alert and oriented to person, place, and time.   Skin: Skin is warm and dry.   Psychiatric: He has a normal mood and affect.             Assessment   Salo was seen today for port placement.    Diagnoses and all orders for this visit:    Diffuse large B-cell lymphoma of extranodal site excluding spleen and other solid organs (CMS/HCC)  -     Case Request; Standing  -     Case Request    Other orders  -     Follow anesthesia standing orders.  -     " Provide NPO Instructions to Patient; Future  -     Chlorhexidine Skin Prep; Future      Salo Mcotezuma is a 74 y.o. male with diffuse large B-cell lymphoma and need for chemotherapy access.  The patient understands the risk and benefits of the procedure and will proceed with port placement 3/20/2020.    Patient's Body mass index is 27.01 kg/m². BMI is above normal parameters. Recommendations include: educational material.

## 2020-03-19 NOTE — TELEPHONE ENCOUNTER
Corry with Dr. Pina office called needing cardiac clearance for patient to have a portacath placement tomorrow. Patient has not been holding aspirin. According to our records, I do not see where patient has had any stenting.       I will send a message to Corry Zhao to let her know when clearance letter is in chart.

## 2020-03-19 NOTE — DISCHARGE INSTRUCTIONS
ARRIVAL TIME 06:30 AM    TAKE the following medications the morning of surgery:  All heart or blood pressure medications    HOLD all diabetic medications the morning of surgery as ordered by physician.    Please discontinue all blood thinners and anticoagulants (except aspirin) prior to surgery as per your surgeon and cardiologist instructions.  Aspirin may be continued up to the day prior to surgery.     CHLORHEXIDINE CLOTHS GIVEN WITH INSTRUCTIONS AND FORM TO RETURN TO HOSPITAL, IF APPLICABLE.    General Instructions:  · Do not eat or drink after midnight: includes water, mints, or gum. You may brush your teeth.  Dental appliances that are removable must be taken out day of surgery.  · Do not smoke, chew tobacco, or drink alcohol.  · Bring medications in original bottles, any inhalers and if applicable your C-PAP/BI-PAP machine.  · Bring any papers given to you in the doctor's office.  · Wear clean comfortable clothes and socks.  · Do not wear contact lenses or make-up. Bring a case for your glasses if applicable.  · Bring crutches or walker if applicable.  · Leave all other valuables and jewelry at home.    If you were given a blood bank ID arm band remember to bring it with you the day of surgery.    Preventing a Surgical Site Infection:  Shower the night before surgery (unless instructed other wise) using a fresh bar of anti-bacterial soap (such as Dial) and clean washcloth. Dry with a clean towel and dress in clean clothing.  For 2 to 3 days before surgery, avoid shaving with a razor near where you will have surgery because the razor can irritate skin and make it easier to develop an infection. Ask your surgeon if you will be receiving antibiotics prior to surgery.  Make sure you, your family, and all healthcare providers clear their hands with soap and water or an alcohol-based hand  before caring for you or your wound.  If at all possible, quit smoking as many days before surgery as you can.    Day  of surgery:  Upon arrival, a Pre-op nurse and Anesthesiologist will review your health history, obtain vital signs, and answer questions you may have. The only belongings needed at this time will be your home medications and if applicable your C-PAP/BI-PAP machine. If you are staying overnight your family can leave the rest of your belongings in the car and bring them to your room later. A Pre-op nurse will start an IV and you may receive medication in preparation for surgery, including something to help you relax. Your family will be able to see you in the Pre-op area. While you are in surgery your family should notify the waiting room  if they leave the waiting room area and provide a contact phone number.    Please be aware that surgery does come with discomfort. We want to make every effort to control your discomfort so please discuss any uncontrolled symptoms with your nurse. Your doctor will most likely have prescribed pain medications.    If you are going home after surgery you will receive individualized written care instructions before being discharged. A responsible adult must drive you to and from the hospital on the day of surgery and stay with you for 24 hours.    If you are staying overnight following surgery, you will be transported to your hospital room following the recovery period.  [facility] has all private rooms.    If you have any questions please call Pre-Admission Testing at [phone].  Deductibles and co-payments are collected on the day of service. Please be prepared to pay the required co-pay, deductible or deposit on the day of service as defined by your plan.    A RESPONSIBLE PERSON MUST REMAIN IN THE WAITING ROOM DURING YOUR PROCEDURE AND A RESPONSIBLE  MUST BE AVAILABLE UPON YOUR DISCHARGE.

## 2020-03-19 NOTE — H&P (VIEW-ONLY)
Subjective   Salo Moctezuma is a 74 y.o. male is being seen for consultation today at the request of Huey Lewis APRN    Salo Moctezuma is a 74 y.o. 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  The patient requires chemotherapy and will need long-term chemotherapy access.  No previous cervical incisions.  No anticoagulation.      Past Medical History:   Diagnosis Date   • Heart disease    • History of cholecystectomy    • Hyperlipidemia    • Hypertension    • Hypothyroidism    • Lymphoma (CMS/HCC)    • Mitral valve prolapse    • Sleep apnea        Family History   Problem Relation Age of Onset   • Heart attack Father    • Colon cancer Sister    • Heart disease Brother        Social History     Socioeconomic History   • Marital status:      Spouse name: Not on file   • Number of children: Not on file   • Years of education: Not on file   • Highest education level: Not on file   Tobacco Use   • Smoking status: Never Smoker   • Smokeless tobacco: Current User     Types: Chew   Substance and Sexual Activity   • Alcohol use: Not Currently     Comment: used to drink beer and wine but no in the past 2 years   • Drug use: No   • Sexual activity: Defer       Past Surgical History:   Procedure Laterality Date   • CARDIOVASCULAR STRESS TEST  11/30/2017    L. Myoview- Small Inferolateral Ischemia   • ECHO - CONVERTED  08/23/2017    EF 60-65%, mild MR, RVSP 41 mmHg   • EXERCISE STRESS - CONVERTED  08/23/2017    R.Stress- 6 min, 76% THR, 172/75, frequent PACs, inconclusive    • OTHER SURGICAL HISTORY  09/10/2017    Event Monitor- baseline sinus,brief atrial run, lowest HR 50 highest 112,        Review of Systems   Constitutional: Negative for activity change, appetite change, chills and fever.   HENT: Negative for sore throat and trouble swallowing.    Eyes: Negative for visual disturbance.   Respiratory: Negative for cough and shortness of breath.    Cardiovascular: Negative for chest pain  "and palpitations.   Gastrointestinal: Negative for abdominal distention, abdominal pain, blood in stool, constipation, diarrhea, nausea and vomiting.   Endocrine: Negative for cold intolerance and heat intolerance.   Genitourinary: Negative for dysuria.   Musculoskeletal: Negative for joint swelling.   Skin: Negative for color change, rash and wound.   Allergic/Immunologic: Negative for immunocompromised state.   Neurological: Negative for dizziness, seizures, weakness and headaches.   Hematological: Negative for adenopathy. Does not bruise/bleed easily.   Psychiatric/Behavioral: Negative for agitation and confusion.         /80   Temp 98.5 °F (36.9 °C)   Ht 175.3 cm (69.02\")   Wt 83 kg (183 lb)   BMI 27.01 kg/m²   Objective   Physical Exam   Constitutional: He is oriented to person, place, and time. He appears well-developed.   HENT:   Head: Normocephalic and atraumatic.   Mouth/Throat: Mucous membranes are normal.   Eyes: Pupils are equal, round, and reactive to light. Conjunctivae are normal.   Neck: Neck supple. No JVD present. No tracheal deviation present. No thyromegaly present.   Cardiovascular: Normal rate and regular rhythm. Exam reveals no gallop and no friction rub.   No murmur heard.  Pulmonary/Chest: Effort normal and breath sounds normal.   Abdominal: Soft. He exhibits no distension. There is no splenomegaly or hepatomegaly. There is no tenderness. No hernia.   Musculoskeletal: Normal range of motion. He exhibits no deformity.   Neurological: He is alert and oriented to person, place, and time.   Skin: Skin is warm and dry.   Psychiatric: He has a normal mood and affect.             Assessment   Salo was seen today for port placement.    Diagnoses and all orders for this visit:    Diffuse large B-cell lymphoma of extranodal site excluding spleen and other solid organs (CMS/HCC)  -     Case Request; Standing  -     Case Request    Other orders  -     Follow anesthesia standing orders.  -     " Provide NPO Instructions to Patient; Future  -     Chlorhexidine Skin Prep; Future      Salo Moctezuma is a 74 y.o. male with diffuse large B-cell lymphoma and need for chemotherapy access.  The patient understands the risk and benefits of the procedure and will proceed with port placement 3/20/2020.    Patient's Body mass index is 27.01 kg/m². BMI is above normal parameters. Recommendations include: educational material.

## 2020-03-20 NOTE — ANESTHESIA PREPROCEDURE EVALUATION
Anesthesia Evaluation     Patient summary reviewed and Nursing notes reviewed   no history of anesthetic complications:  NPO Solid Status: > 8 hours  NPO Liquid Status: > 8 hours           Airway   Mallampati: II  TM distance: >3 FB  Neck ROM: full  No difficulty expected  Dental - normal exam   (+) lower dentures and upper dentures    Pulmonary - normal exam    breath sounds clear to auscultation  (+) a smoker (chew) Current Abstained day of surgery, shortness of breath, sleep apnea,   Cardiovascular   Exercise tolerance: good (4-7 METS)    NYHA Classification: II  ECG reviewed  Rhythm: regular  Rate: normal    (+) hypertension, valvular problems/murmurs MR, AS and murmur, dysrhythmias PAC, STEPHENS, murmur, hyperlipidemia,       Neuro/Psych- negative ROS  GI/Hepatic/Renal/Endo    (+)   thyroid problem hypothyroidism    Musculoskeletal     Abdominal  - normal exam    Bowel sounds: normal.   Substance History - negative use     OB/GYN negative ob/gyn ROS         Other   arthritis,    history of cancer active    ROS/Med Hx Other: LVEF 61%.  Mild AS/MR/TR.  RVSP 45mmHg.                   Anesthesia Plan    ASA 3     general   total IV anesthesia  intravenous induction     Anesthetic plan, all risks, benefits, and alternatives have been provided, discussed and informed consent has been obtained with: patient.    Plan discussed with CRNA.

## 2020-03-20 NOTE — OP NOTE
INSERTION OF PORTACATH  Procedure Note    Salo Moctezuma  3/20/2020    Pre-op Diagnosis:   Diffuse large B-cell lymphoma of extranodal site excluding spleen and other solid organs (CMS/HCC) [C83.39]    Post-op Diagnosis:     Post-Op Diagnosis Codes:     * Diffuse large B-cell lymphoma of extranodal site excluding spleen and other solid organs (CMS/HCC) [C83.39]    Procedure(s):  INSERTION OF PORTACATH    Surgeon(s):  Bryan Pina MD    Indication:  Lymphoma    Anesthesia: Choice    Staff:   Circulator: Mary Jo Ruby RN  Radiology Technologist: Christiano Galindo  Scrub Person: Aj Hermosillo  Assistant: Cristofer Sorto    Operative Description: The patient was taken operating suite and placed supine on operating table.  Bilateral sequential compression devices were applied and anesthesia was administered.  The right neck and chest are prepped and draped in sterile fashion.  Timeout procedure was performed after antibiotics and been confirmed.   The right neck and chest were then infiltrated with local anesthetic.  Under ultrasound visualization an 18-gauge access needle was inserted into the right internal jugular vein under direct visualization.  Venous blood was aspirated.  Through the access needle guidewire was placed without resistance.  Ultrasound and fluoroscopy confirmed guidewire appropriate position and course.  A stab incision at the guidewire entry site was made.  2 fingerbreadths below the right clavicle a transverse incision was then made and with blunt and cautery dissection a subcutaneous pocket was created.  The catheter was then tunneled subcutaneously over the clavicle through the guidewire entry site in the right neck.  The catheter was cut to appropriate length based on patient height and the port was assembled and placed in the subcutaneous pocket.  Under fluoroscopic visualization the dilator introducer sheath was inserted via Seldinger technique over the guidewire  into the right internal jugular vein.  The guidewire and sheath were removed.  The catheter was inserted into the removable sheath and the sheath was removed.  Fluoroscopy showed the catheter tip in appropriate position with no evidence of kinking.  The port was accessed and withdrew venous blood easily and flushed with heparinized saline solution easily.  The port was secured to the pectoralis fascia with Prolene sutures.  The port site was closed with subcutaneous Vicryl and Monocryl subcuticular suture.  The right neck incision was closed with a subcuticular Monocryl suture.  The incisions are dressed with sure close and the patient was awakened from anesthesia after all counts were correct and taken to recovery where stat chest x-ray was ordered confirming placement.    Estimated Blood Loss: 10mL    Specimens:   none                 Drains: none    Grafts/Implants: port R IJ    Findings: R IJ port flushed easily    Complications: none      Bryan Pina MD     Date: 3/20/2020  Time: 08:17

## 2020-03-20 NOTE — ANESTHESIA POSTPROCEDURE EVALUATION
Patient: Salo Moctezuma    Procedure Summary     Date:  03/20/20 Room / Location:   [facility] 02 /  [facility]    Anesthesia Start:  0746 Anesthesia Stop:  0816    Procedure:  INSERTION OF PORTACATH (N/A ) Diagnosis:       Diffuse large B-cell lymphoma of extranodal site excluding spleen and other solid organs (CMS/HCC)      (Diffuse large B-cell lymphoma of extranodal site excluding spleen and other solid organs (CMS/HCC) [C83.39])    Surgeon:  Bryan Pina MD Provider:  Jhoan Morgan DO    Anesthesia Type:  general ASA Status:  3          Anesthesia Type: general    Vitals  Vitals Value Taken Time   /58 3/20/2020  8:47 AM   Temp 98.1 °F (36.7 °C) 3/20/2020  8:18 AM   Pulse 72 3/20/2020  8:47 AM   Resp 24 3/20/2020  8:47 AM   SpO2 97 % 3/20/2020  8:47 AM           Post Anesthesia Care and Evaluation    Patient location during evaluation: PHASE II  Patient participation: complete - patient participated  Level of consciousness: awake and alert  Pain score: 0  Pain management: adequate  Airway patency: patent  Anesthetic complications: No anesthetic complications  PONV Status: controlled  Cardiovascular status: acceptable  Respiratory status: acceptable and room air  Hydration status: euvolemic  No anesthesia care post op

## 2020-03-27 NOTE — TELEPHONE ENCOUNTER
Telephoned pt with travel history screening and spoke with pt's wife. Pt's wife stated pt has not had any SOA, cough or fever.  Pt has not been out of the country in the last 14 days and has not been around anyone with COVID-19

## 2020-03-30 NOTE — PROGRESS NOTES
"SS initiated social service assessment with patient this date.  SS spoke with patient and spouse Eunice.  Patient lives at home with spouse. Patient and spouse will be patients of ours.  Patient is diagnosed with Diffuse Large B-Cell Lymphoma and spouse is diagnosed with Rectal Cancer.    Patient and spouse have one living son Anna Moctezuma and daughter in law Kasandra Moctezuma and one  daughter that  of colon cancer in 2018.    Patient doesn't utilize any home health.    Patient has home oxygen per [facility] in [place] that he uses at nighttime only.    Advance Care Planning:  The patient and I discussed care planning \"Conversations that Matter.\" This service was offered, free of charge, for development of advance directives with a certified ACP facilitator. The patient does not have an up-to-date advance directive. The patient is not interested in an appointment with one of our facilitators to create or update their advanced directives.    The patient and I discussed the results of his PHQ depression screening.    PHQ-9 Total Score:  1    Patient completed NCCN Distress Screening and scored a 3 out of 10.    SS provided patient with vanilla boost per dietary services this date.     SS will follow as needed.    Patient and spouse assist each other with transportation.    "

## 2020-04-03 NOTE — TELEPHONE ENCOUNTER
Spouse reports patient had a nose bleed yesterday. (She states he has a polyp in his nose so she thought that maybe that was the source of bleeding as it does this sometimes when he blows his nose) She states they got it stopped without much difficulty. He is scheduled for labs next Tuesday. Inst her if he feels like we need to check them on Monday instead to call and we will have him come in Monday. Informed her someone is on call this weekend if he has problems. Verb understanding.

## 2020-04-06 NOTE — TELEPHONE ENCOUNTER
----- Message from Shonda Leonard sent at 4/6/2020  3:24 PM EDT -----  Regarding: CONCERNED ABOUT SYMPTOMS  Contact: [phone]  Wife called stating that Salo has been having flu like symptoms after his treatment last week and he's due in again tomorrow but wants to make sure he needs to come in. Thank you

## 2020-04-06 NOTE — TELEPHONE ENCOUNTER
Spoke with pt's wife.  Pt's wife stated pt was c/o dry cough, fatigue and dry mouth.  Pt's temp has been running around 97.9.  Pt is scheduled for labs tomorrow.  Dr. Denny aware of pt's symptoms.  Dr. Denny wanted me to make sure pt was making urine.  Pt's wife confirmed pt was making urine and denies any pain or burning with urination.  Pt's wife aware pt's symptoms were due to chemotherapy.  Dr. Denny stated pt did not have to come in tomorrow for labs.  Pt's wife aware.  Pt's lab appointment cancelled for tomorrow.  Pt's wife aware to call regarding any questions or concerns.

## 2020-04-17 NOTE — TELEPHONE ENCOUNTER
Contacted pt with COVID-19 screening, pt denies any fever, SOA or cough.  Pt hasn't traveled anywhere or been around anyone with COVID-19.  Pt aware of visitor policy and verified understanding.

## 2020-04-20 NOTE — PROGRESS NOTES
"  Name:  Salo Moctezuma  :  1945  Date:  2020     REFERRING PHYSICIAN  Josue Haddad MD    PRIMARY CARE PROVIDER  Huey Lewis APRN    REASON FOR FOLLOWUP  1. 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    Dear Dr. Haddad,    HISTORY OF PRESENT ILLNESS:   I saw Mr. Moctezuma in follow up today in our hematology/oncology clinic. As you are aware, he is a pleasant, 74 y.o., white male with a history of hypertension and hypothyroidism who first started noticing some \"blotchy\" spots on his arms and legs several months ago, in ~2019 (actually probably longer ago than that, according to his daughter-in-law). As these spots waxed and waned for a while and were not causing any significant problems, they were routinely monitored for a while. However, by 2020, several of them had increased in size to the point that they were more concerning; and you performed excisional biopsies on two of them (one on the right, anterior forearm and one on the left, posterior upper arm). Unexpectedly, the pathology results on both specimens were consistent with large B-cell lymphoma. He was subsequently referred to our clinic for further evaluation and management. At the time of his initial appointment with us (on 03/10/2020), he was agreeable to undergoing an initial staging NM PET scan as well as an echocardiogram and powerport placement in anticipation of starting systemic chemotherapy soon. Once these steps were taken, he began R-CHOP on 2020.    INTERIM HISTORY:  Mr. Moctezuma returns to clinic today for follow up by himself. The patient's family has stated in the past that they are convinced that he (the patient) occasionally " "hears voices; however, he personally has no specific neurological complaints and continues to respond to questions appropriately. He states today that he tolerated the first cycle of CHOP-R chemotherapy (which began on 03/30/2020) very well, with no noticeable side effects. With this treatment, all of the spots on his skin, particularly on his lower legs, seem to be \"drying up\". The swelling in both of his legs is much better as well. He was unable to tolerate allopurinol because it seemed to cause some diffuse hives (or, at least, a rash that was above and beyond the baseline DLBCL skin lesions). He otherwise has no other specific complaints and continues to feel to be in his usual state of health.    Past Medical History:   Diagnosis Date   • Arthritis    • Depression    • Heart disease    • History of cholecystectomy    • Hyperlipidemia    • Hypertension    • Hypothyroidism    • Lymphoma (CMS/HCC)    • Mitral valve prolapse    • Sleep apnea        Past Surgical History:   Procedure Laterality Date   • ARM NEEDLE BIOPSY     • CARDIOVASCULAR STRESS TEST  11/30/2017    L. Myoview- Small Inferolateral Ischemia   • ECHO - CONVERTED  08/23/2017    EF 60-65%, mild MR, RVSP 41 mmHg   • EXERCISE STRESS - CONVERTED  08/23/2017    R.Stress- 6 min, 76% THR, 172/75, frequent PACs, inconclusive    • GALLBLADDER SURGERY     • OTHER SURGICAL HISTORY  09/10/2017    Event Monitor- baseline sinus,brief atrial run, lowest HR 50 highest 112,    • PORTACATH PLACEMENT N/A 3/20/2020    Procedure: INSERTION OF PORTACATH;  Surgeon: Bryan Pina MD;  Location: [facility];  Service: General;  Laterality: N/A;   • SKIN BIOPSY         Social History     Socioeconomic History   • Marital status:      Spouse name: Not on file   • Number of children: Not on file   • Years of education: Not on file   • Highest education level: Not on file   Tobacco Use   • Smoking status: Never Smoker   • Smokeless tobacco: Current User     Types: " Chew   Substance and Sexual Activity   • Alcohol use: Not Currently     Comment: used to drink beer and wine but no in the past 2 years   • Drug use: No   • Sexual activity: Defer       Family History   Problem Relation Age of Onset   • Heart attack Father    • Colon cancer Sister    • Heart disease Brother        Allergies   Allergen Reactions   • Sulfa Antibiotics Other (See Comments)     yeast   • Allopurinol Rash       Current Outpatient Medications   Medication Sig Dispense Refill   • aspirin 81 MG EC tablet Take 81 mg by mouth Daily.     • atorvastatin (LIPITOR) 10 MG tablet Take 10 mg by mouth Every Night.     • cetirizine (zyrTEC) 10 MG tablet Take 10 mg by mouth Daily.     • cholecalciferol (VITAMIN D3) 25 MCG (1000 UT) tablet Take 1,000 Units by mouth Daily.     • isosorbide mononitrate (IMDUR) 30 MG 24 hr tablet Take 1 tablet by mouth Daily. 90 tablet 3   • levothyroxine (SYNTHROID, LEVOTHROID) 50 MCG tablet Take 50 mcg by mouth Daily.     • lidocaine-prilocaine (EMLA) 2.5-2.5 % cream Apply to VAD 1 hour prior to VAD access. 30 g 5   • magic mouthwash oral suspension Swish and swallow 5 mL Every 6 (Six) Hours As Needed for Mucositis. 240 mL 3   • montelukast (SINGULAIR) 10 MG tablet Take 10 mg by mouth Every Night.     • ondansetron (ZOFRAN) 8 MG tablet Take 1 tablet by mouth 3 (Three) Times a Day As Needed for Nausea or Vomiting. 30 tablet 5   • prochlorperazine (COMPAZINE) 10 MG tablet Take 1 tablet by mouth Every 6 (Six) Hours As Needed for Nausea or Vomiting. 30 tablet 5   • propranolol LA (INDERAL LA) 120 MG 24 hr capsule Take 120 mg by mouth Daily.     • vitamin B-12 (CYANOCOBALAMIN) 100 MCG tablet Take 250 mcg by mouth Daily.       No current facility-administered medications for this visit.      REVIEW OF SYSTEMS  CONSTITUTIONAL:  No fever, chills or night sweats.  EYES:  No blurry vision, diplopia or other vision changes.  ENT:  No hearing loss, nosebleeds or sore throat.  CARDIOVASCULAR:  No  palpitations, arrhythmia, syncopal episodes or edema.  PULMONARY:  No hemoptysis, wheezing, chronic cough or shortness of breath.  GASTROINTESTINAL:  No nausea or vomiting.  No constipation or diarrhea.  No abdominal pain.  GENITOURINARY:  No hematuria, kidney stones or frequent urination.  MUSCULOSKELETAL:  No joint or back pains.  INTEGUMENTARY: As per the HPI above.  ENDOCRINE:  No excessive thirst or hot flashes.  HEMATOLOGIC:  No history of free bleeding, spontaneous bleeding or clotting.  IMMUNOLOGIC:  No allergies or frequent infections.  NEUROLOGIC: No numbness, tingling, seizures or weakness. Reportedly occasionally hears voices, per the patient's daughter-in-law.  PSYCHIATRIC:  No anxiety or depression.    PHYSICAL EXAMINATION  /70   Pulse 67   Temp 97.2 °F (36.2 °C) (Oral)   Resp 18   Wt 79.2 kg (174 lb 8 oz)   SpO2 98%   BMI 25.77 kg/m²     Pain Score:  Pain Score    04/20/20 0827   PainSc: 0-No pain       PHQ-Score Total:  PHQ-9 Total Score:      GENERAL:  A well-developed, well-nourished, elderly, white male in no acute distress.  HEENT:  Pupils equally round and reactive to light.  Extraocular muscles intact.  CARDIOVASCULAR:  Regular rate and rhythm.  No murmurs, gallops or rubs.  LUNGS:  Clear to auscultation bilaterally.  ABDOMEN:  Soft, nontender, nondistended with positive bowel sounds.  EXTREMITIES:  No clubbing, cyanosis or edema bilaterally.  SKIN:  Previously: Multiple, erythematous, bulbous lesions ~1 cm in size or less each, involving the posterior, distal bilateral lower extremities (R>L). A couple of isolated, similar lesions on the bilateral upper thighs. All of these areas are now much clearer and improved since starting CHOP-R in late March 2020.  NEURO:  Cranial nerves grossly intact. No focal deficits.  PSYCH:  Alert and oriented x3.    The physical exam is unchanged from previous.    LABORATORY  Lab Results   Component Value Date    WBC 9.05 03/30/2020    HGB 14.4  03/30/2020    HCT 45.9 03/30/2020    MCV 89.5 03/30/2020     03/30/2020    NEUTROABS 5.15 03/30/2020       Lab Results   Component Value Date     03/31/2020    K 4.4 03/31/2020     03/31/2020    CO2 24.2 03/31/2020    BUN 18 03/31/2020    CREATININE 0.92 03/31/2020    GLUCOSE 163 (H) 03/31/2020    CALCIUM 9.3 03/31/2020    AST 33 03/30/2020    ALT 34 03/30/2020    ALKPHOS 139 (H) 03/30/2020    BILITOT 0.7 03/30/2020    PROTEINTOT 6.8 03/30/2020    ALBUMIN 3.71 03/30/2020     CBC (03/30/2020): WBCs: 9.05; HgB: 14.4; Hct: 45.9; platelets: 203    IMAGING  Echocardiogram (03/12/2020):  Impression: Normal left ventricular cavity size and wall thickness noted. All left ventricular wall segments contract normally. Estimated EF appears to be in the range of 61-65%.    MRI brain with and without contrast (03/13/2020):  Impression:  1) No evidence of metastatic disease to the brain.  2) Chronic microangiopathic changes.  3) No mass, hemorrhage or midline shift.    NM PET with fusion CT, skull base to mid-thigh (03/13/2020):  Impression: There is a solitary focal area of increased glucose uptake in the nasal cavity on the right where there is a 2 cm, fairly well-circumscribed nodule. There were some inflammatory changes in the maxillary sinus on the right that showed no hypermetabolic activity. No lymphadenopathy was demonstrated in the neck, chest, abdomen or pelvis.    PATHOLOGY  Skin, left upper extremity lesion, excisional biopsy (02/21/2020): Diffuse large B-cell lymphoma, germinal center type.    Skin, right distal upper extremity lesion (02/21/2020):  Diffuse large B-cell lymphoma, germinal center type.    IMPRESSION AND PLAN  Mr. Moctezuma is a 74 y.o., white male with:  1. Large B-cell lymphoma: Surprisingly diagnosed in late February 2020 with cutaneous involvement by this disease when the results of excisional biopsies on two, upper extremity skin lesions resulted. Similar lesions continued to wax  "and wane on his lower extremities (particularly on his shins and calves, R>L). A NM PET scan performed for staging on 03/13/2020 is summarized above; and, other than a nonspecific, \"fairly well-circumscribed, 2 cm nodule\" in the right nasal cavity (see below), was completely unremarkable. I have had several, long discussions with the patient (+/- his wife) regarding this diagnosis and, in, general, its prognosis. In short, while this was/is an unusual presentation of a fairly common form of lymphoma, it is typically treatable, and potentially curable, with systemic chemotherapy. Although the initial staging NM PET scan was largely negative, with the apparent, diffuse cutaneous involvement (although none were specifically biopsied, multiple lesions were still present on his legs, arms and torso; and the majority of them were acting identically to the ones that were biopsy confirmed), four to six cycles of [id number] R-CHOP were/are recommended. A baseline echocardiogram (perfomed on 03/12/2020) was within normal limits. Once a powerport was placed, he began this treatment regimen on 03/30/2020. He has completed one (1) cycle to date and tolerated it overall extremely well, with no noticeable side effects. With just this one cycle, all of the skin lesions, particularly on his lower legs, have drastically improved. He was unable to tolerate allopurinol (as it apparently caused hives), but this is not currently an issue given his now very minimal disease burden. We will proceed with this current treatment plan (day #1 of cycle #2 today). We will see him back in clinic in both three weeks (on day #1 of the third cycle) and six weeks (on day #1 of the fourth cycle) with CBCs and CMPs for symptom/toxicity checks.  2. Auditory hallucinations: Although the patient has always seemed completely appropriate (and oriented) in clinic, his wife and daughter-in-law have stated in the past that they are convinced that he (the patient) " "has been \"hearing voices\" for at least the past several months. An MRI of the brain was performed on 03/13/2020 for further evaluation, and this showed findings consistent with microangiopathic changes only. While CNS involvement by issue #1 is certainly a risk; the yield of performing any additional testing at this time (such as a lumbar puncture) remains very low. Continue to monitor.  3. Nasal cavity nodule: A \"solitary, focal area of increased glucose uptake in the nasal cavity on the right where there is a 2 cm, fairly well-circumscribed nodule\" along with some inflammatory changes were the only abnormal, hypermetabolic findings on the initial staging NM PET scan (performed on 03/13/2020 and summarized above). Both a focus of issue #1 and an unrelated, secondary malignancy are certainly both on the differential. As discussed above, systemic treatment for the known, diffuse cutaneous involvement by issue #1 is the current treatment priority and is now ongoing. Close attention will be paid to this area in followup (given the marked clinical response of his diffuse skin lesions, we will wait to (hopefully) obtain a repeat PET scan after he has completed four to six cycles of R-CHOP. If it (the nasal cavity nodule) does not respond to R-CHOP therapy, a referral to ENT will be warranted.  The patient was in agreement with these plans.    It is a pleasure to participate in Mr. Moctezuma's care. Please do not hesitate to call with any questions or concerns that you may have.    A total of 30 minutes were spent coordinating this patient’s care in clinic today; more than 50% of this time was face-to-face with the patient, reviewing his interim medical history and counseling on the current treatment and followup plan. All questions were answered to his satisfaction.    FOLLOW UP  Proceed with [id number] R-CHOP as planned (day #1 of cycle #2 today). Return to our clinic in both 3 weeks (on day #1 of cycle #3) and 6 weeks (on " day #1 of cycle #4) with CBCs and CMPs.            This document was electronically signed by SANJEEV Denny MD April 20, 2020 08:45      CC: MD Bryan Schumacher MD Terry Lawson, APRN

## 2020-04-22 NOTE — ED NOTES
Pt alert and oriented, skin pwd, pt continues to cough, hemoptysis noted, pt remains on 15l bubble high flow. Normal sinus noted. Implantable port in right chest wall remains accessed with 20 gauge, ns infusing at 125ml/hr with no s/s of infiltration. poc updated to pt. Pt waiting on bed to become available at [facility]. Obtained permission from patient to intubate pt and place on ventilator per dr taylor. Pt verbalized understanding and gave informed verbal consent.           Ksenia Hsu, RN  04/22/20 2510

## 2020-04-22 NOTE — ED NOTES
Pt alert and oriented, skin pwd, pt continues to cough, hemoptysis noted, pt remains on 15l bubble high flow. Normal sinus noted. Implantable port in right chest wall remains accessed with 20 gauge, vancomycin continues to infuse at 250ml/hr and ns infusing at 125ml/hr with no s/s of infiltration. poc updated to pt by myself and dr taylor. Pt to be transferred due to no available [facility] beds. Pt verbalized understanding. Pt provided water.      Ksenia Hsu, RN  04/22/20 1500

## 2020-04-22 NOTE — ED NOTES
At this time [facility] does not have a  Bed assignment per [place] [facility] coordinator Hoa, she stated she is waiting to hear from their house supervisor and will call me as soon as she does.      Malina Pinto P  04/22/20 8042

## 2020-04-22 NOTE — ED NOTES
Air evac performed weather check to [facility], they accepted the flight their estimated eta given was 11 minutes.   Security and house super have been made aware of pts new transfer details.      Malina Pinto  04/22/20 6813

## 2020-04-22 NOTE — ED NOTES
Upon returning from ct, pt began to cough more, blood noted, sob increasing, provider made aware, respiratory at bedside. Pt remains on on 15 liters bubble high flow nasal cannula, pt remains in ns rhythm.      Ksenia Hsu, RN  04/22/20 1400

## 2020-04-22 NOTE — ED NOTES
Pt remains ventilated at this time. 8.0 et tube remains secure to right lip at 26cm. 16 fr frey cath remains patent and draining handy colored urine at this time. Port to right chest wall remains accessed with 20 gauge catheter, ns infusing at 125ml/hr and diprivan 20mckg/kg/min with no s/s of infiltration. rass score at this time -4. Ns noted, hr 8o. rr 22 bpm. Pt waiting on bed assignment at [facility].      Ksenia Hsu, DUANE  04/22/20 1700

## 2020-04-22 NOTE — ED NOTES
Pt grimaces, attempts to move hands toward ETT, opens eyes.  Dr Ambriz made aware.  ETT remains secured in place @ 26 cm depth. SpO2 95%.      Amanda Dueñas, DUANE  04/22/20 1960

## 2020-04-22 NOTE — ED NOTES
Pt remains ventilated and intubated via 8 ett tube secured at 26cm at right lip and continued at time of transfer. Port to right chest wall remains accessed with 20 gauge, ns infusing at 125ml/hr and diprivan infusing at 45mcg/kg/min, no s/s of infiltration, also continued at time of transfer. 18 gauge in left ac remains patent with ns infusing wide open, 500ml, and versed infusing at 1mg/hr and continued at time of transfer. 16 fr frey remains patent and continued at time of transfer.       Ksenia Hsu, RN  04/22/20 3497

## 2020-04-22 NOTE — ED NOTES
[facility] trev [facility] accepted stated they'll be out here in a minute     Malina Pinto P  04/22/20 7624

## 2020-04-22 NOTE — ED NOTES
Spoke to pt daughter in law and provided updated. Daughter in law is levtabatha syed and can be reached at [phone].     Ksenia Hsu, DUANE  04/22/20 6287

## 2020-04-22 NOTE — ED NOTES
Pt dentures, boots, jeans, shirt, and jacket placed in pt belongings bag and pt stickers placed on bag.      Ksenia Hsu RN  04/22/20 7529

## 2020-04-22 NOTE — TELEPHONE ENCOUNTER
Eunice Moctezuma(spouse) wanting to speak to clinical team member about patient's medication. Specifically his Prednisone and if patient should have as he is currently in the hospital. Her callback is [phone]. Said to call [phone] if she does not answer first number.

## 2020-04-22 NOTE — ED PROVIDER NOTES
Subjective   Patient is a 74-year-old male who presents complaining of worsening shortness of breath and cough since yesterday.  He complains that he developed hemoptysis about an hour and a half prior to arriving here.  Patient has large B cell lymphoma for which he follows with Dr. Denny.  He had chemotherapy and Neulasta day before yesterday.  He denies fever, chills, headaches, chest pain, abdominal pain, vomiting, syncope or near syncope, focal numbness or weakness, other symptoms or other complaints.  He denies any known or suspected COVID-19 exposure.  EMS reports a pulse ox of 96% on room air during their transport.          Review of Systems   Constitutional: Negative for chills, diaphoresis and fever.   HENT: Negative for ear pain, sore throat and trouble swallowing.    Eyes: Negative for photophobia and pain.   Respiratory: Positive for cough and shortness of breath.    Cardiovascular: Negative for chest pain and palpitations.   Gastrointestinal: Negative for abdominal distention, abdominal pain, blood in stool, diarrhea, nausea and vomiting.   Endocrine: Negative for polydipsia and polyphagia.   Genitourinary: Negative for difficulty urinating and flank pain.   Musculoskeletal: Negative for back pain, neck pain and neck stiffness.   Skin: Negative for color change and pallor.   Neurological: Negative for seizures, syncope and speech difficulty.   Psychiatric/Behavioral: Negative for confusion.   All other systems reviewed and are negative.      Past Medical History:   Diagnosis Date   • Arthritis    • Depression    • Heart disease    • History of cholecystectomy    • Hyperlipidemia    • Hypertension    • Hypothyroidism    • Lymphoma (CMS/HCC)    • Mitral valve prolapse    • Sleep apnea        Allergies   Allergen Reactions   • Sulfa Antibiotics Other (See Comments)     yeast   • Allopurinol Rash       Past Surgical History:   Procedure Laterality Date   • ARM NEEDLE BIOPSY     • CARDIOVASCULAR STRESS  TEST  11/30/2017    L. Myoview- Small Inferolateral Ischemia   • ECHO - CONVERTED  08/23/2017    EF 60-65%, mild MR, RVSP 41 mmHg   • EXERCISE STRESS - CONVERTED  08/23/2017    R.Stress- 6 min, 76% THR, 172/75, frequent PACs, inconclusive    • GALLBLADDER SURGERY     • OTHER SURGICAL HISTORY  09/10/2017    Event Monitor- baseline sinus,brief atrial run, lowest HR 50 highest 112,    • PORTACATH PLACEMENT N/A 3/20/2020    Procedure: INSERTION OF PORTACATH;  Surgeon: Bryan Pina MD;  Location: [facility] OR;  Service: General;  Laterality: N/A;   • SKIN BIOPSY         Family History   Problem Relation Age of Onset   • Heart attack Father    • Colon cancer Sister    • Heart disease Brother        Social History     Socioeconomic History   • Marital status:      Spouse name: Not on file   • Number of children: Not on file   • Years of education: Not on file   • Highest education level: Not on file   Tobacco Use   • Smoking status: Never Smoker   • Smokeless tobacco: Current User     Types: Chew   Substance and Sexual Activity   • Alcohol use: Not Currently     Comment: used to drink beer and wine but no in the past 2 years   • Drug use: No   • Sexual activity: Defer           Objective   Physical Exam   Constitutional: He is oriented to person, place, and time. He appears well-developed and well-nourished. He appears ill.   Pulse ox when I entered the room was 47% on room air.   HENT:   Head: Normocephalic and atraumatic.   Eyes: Pupils are equal, round, and reactive to light. EOM are normal. No scleral icterus.   Neck: Normal range of motion. Neck supple. No neck rigidity. No tracheal deviation present.   Cardiovascular: Normal rate, regular rhythm and intact distal pulses.   Pulmonary/Chest: He exhibits no tenderness.   Patient is tachypneic and having hemoptysis.  He is holding a 16 ounce Styrofoam cup that is approximately FDC full of bright red blood.  Rhonchi are heard in all fields, breath sounds  are diminished in the lower halves bilaterally, no wheezing is heard.   Abdominal: Soft. Bowel sounds are normal. There is no tenderness. There is no rebound and no guarding.   Musculoskeletal: Normal range of motion. He exhibits no tenderness.   Neurological: He is alert and oriented to person, place, and time. He has normal strength. No sensory deficit. He exhibits normal muscle tone. Coordination normal. GCS eye subscore is 4. GCS verbal subscore is 5. GCS motor subscore is 6.   Skin: Skin is warm and dry. Capillary refill takes less than 2 seconds. No cyanosis. No pallor.   Psychiatric: He has a normal mood and affect. His behavior is normal.   Nursing note and vitals reviewed.      Intubation  Date/Time: 4/22/2020 4:33 PM  Performed by: Juanjo Mayorga MD  Authorized by: Juanjo Mayorga MD     Consent:     Consent obtained:  Verbal    Consent given by:  Patient  Pre-procedure details:     Mallampati score:  III    Pretreatment meds: etomidate.    Paralytics:  Succinylcholine  Procedure details:     Preoxygenation: Bubble high flow nasal cannula.    Intubation method:  Oral    Laryngoscope blade:  Aguilera 2    Tube size (mm):  8.0    Tube type:  Cuffed    Number of attempts:  2  Placement assessment:     ETT to lip:  24    Tube secured with:  ETT edwards    Breath sounds:  Equal and absent over the epigastrium    Placement verification: chest rise, condensation, CXR verification, direct visualization, equal breath sounds, ETCO2 detector and tube exhalation      CXR findings:  ETT in proper place  Post-procedure details:     Patient tolerance of procedure:  Tolerated well, no immediate complications  Comments:      The first attempt was with the glide scope, and was successful, I visualized the tube to pass between the cords.  Patient was hooked up to the ventilator immediately, but it was not delivering breaths to the patient.  There were no breath sounds on either side, no borborygmi.  I deflated the  balloon, removed the tube, and he was oxygenated with the BVM, and then I intubated the patient orotracheally, this time with direct laryngoscopy using a #2 Aguilera blade.  Equal breath sounds are present, no borborygmi over the abdomen, pulse ox on the ventilator is 96%.      EKG shows sinus rhythm with a rate of 86.  Nonspecific ST-T changes.  XR Chest 1 View   Final Result   Endotracheal tube as above       This report was finalized on 4/22/2020 5:03 PM by Dr. Aj Aguilera MD.          CT Chest Pulmonary Embolism   Final Result   1. No evidence of a pulmonary metastases   2. Extensive diffuse consolidation. Large portion this may represent   edema. There are some groundglass opacities..       This report was finalized on 4/22/2020 1:50 PM by Dr. Aj Aguilera MD.          XR Chest 1 View   Final Result   Extensive bibasilar consolidation       This report was finalized on 4/22/2020 11:45 AM by Dr. Aj Aguilera MD.             -Radiologist tells me verbally that the CT of the chest shows intermediate suspicion of COVID-19.    Results for orders placed or performed during the hospital encounter of 04/22/20   Influenza Antigen, Rapid - Swab, Nasopharynx   Result Value Ref Range    Influenza A Ag, EIA Negative Negative    Influenza B Ag, EIA Negative Negative   Comprehensive Metabolic Panel   Result Value Ref Range    Glucose 116 (H) 65 - 99 mg/dL    BUN 26 (H) 8 - 23 mg/dL    Creatinine 0.90 0.76 - 1.27 mg/dL    Sodium 144 136 - 145 mmol/L    Potassium 3.9 3.5 - 5.2 mmol/L    Chloride 106 98 - 107 mmol/L    CO2 23.9 22.0 - 29.0 mmol/L    Calcium 9.4 8.6 - 10.5 mg/dL    Total Protein 6.3 6.0 - 8.5 g/dL    Albumin 3.58 3.50 - 5.20 g/dL    ALT (SGPT) 19 1 - 41 U/L    AST (SGOT) 32 1 - 40 U/L    Alkaline Phosphatase 129 (H) 39 - 117 U/L    Total Bilirubin 0.7 0.2 - 1.2 mg/dL    eGFR Non African Amer 82 >60 mL/min/1.73    Globulin 2.7 gm/dL    A/G Ratio 1.3 g/dL    BUN/Creatinine Ratio 28.9 (H) 7.0 - 25.0    Anion Gap  14.1 5.0 - 15.0 mmol/L   Protime-INR   Result Value Ref Range    Protime 12.9 11.0 - 15.4 Seconds    INR 0.92 0.90 - 1.10   aPTT   Result Value Ref Range    PTT 24.2 23.8 - 36.1 seconds   Urinalysis With Culture If Indicated - Urine, Catheter   Result Value Ref Range    Color, UA Yellow Yellow, Straw    Appearance, UA Clear Clear    pH, UA <=5.0 5.0 - 8.0    Specific Gravity, UA 1.025 1.005 - 1.030    Glucose, UA Negative Negative    Ketones, UA Negative Negative    Bilirubin, UA Negative Negative    Blood, UA Negative Negative    Protein, UA Negative Negative    Leuk Esterase, UA Negative Negative    Nitrite, UA Negative Negative    Urobilinogen, UA 0.2 E.U./dL 0.2 - 1.0 E.U./dL   BNP   Result Value Ref Range    proBNP 2,473.0 (H) 5.0 - 900.0 pg/mL   Troponin   Result Value Ref Range    Troponin T 0.011 0.000 - 0.030 ng/mL   Troponin   Result Value Ref Range    Troponin T 0.015 0.000 - 0.030 ng/mL   Lactic Acid, Plasma   Result Value Ref Range    Lactate 2.5 (C) 0.5 - 2.0 mmol/L   C-reactive Protein   Result Value Ref Range    C-Reactive Protein 0.28 0.00 - 0.50 mg/dL   T4, Free   Result Value Ref Range    Free T4 1.29 0.93 - 1.70 ng/dL   TSH   Result Value Ref Range    TSH 1.720 0.270 - 4.200 uIU/mL   Magnesium   Result Value Ref Range    Magnesium 1.9 1.6 - 2.4 mg/dL   CBC Auto Differential   Result Value Ref Range    WBC 59.47 (C) 3.40 - 10.80 10*3/mm3    RBC 4.31 4.14 - 5.80 10*6/mm3    Hemoglobin 12.4 (L) 13.0 - 17.7 g/dL    Hematocrit 40.0 37.5 - 51.0 %    MCV 92.8 79.0 - 97.0 fL    MCH 28.8 26.6 - 33.0 pg    MCHC 31.0 (L) 31.5 - 35.7 g/dL    RDW 15.4 12.3 - 15.4 %    RDW-SD 51.5 37.0 - 54.0 fl    MPV 10.2 6.0 - 12.0 fL    Platelets 392 140 - 450 10*3/mm3   Lactate Dehydrogenase   Result Value Ref Range     (H) 135 - 225 U/L   Ferritin   Result Value Ref Range    Ferritin 1,005.00 (H) 30.00 - 400.00 ng/mL   Scan Slide   Result Value Ref Range    Scan Slide     Blood Gas, Arterial With Co-Ox   Result  Value Ref Range    Site Left Brachial     Prakash's Test N/A     pH, Arterial 7.391 7.350 - 7.450 pH units    pCO2, Arterial 42.9 35.0 - 45.0 mm Hg    pO2, Arterial 68.0 (L) 83.0 - 108.0 mm Hg    HCO3, Arterial 26.0 20.0 - 26.0 mmol/L    Base Excess, Arterial 0.8 0.0 - 2.0 mmol/L    O2 Saturation, Arterial 92.8 (L) 94.0 - 99.0 %    Hemoglobin, Blood Gas 12.4 (L) 14 - 18 g/dL    Hematocrit, Blood Gas 37.9 (L) 38.0 - 51.0 %    Oxyhemoglobin 91.5 (L) 94 - 99 %    Methemoglobin 0.20 0.00 - 3.00 %    Carboxyhemoglobin 1.2 0 - 5 %    A-a Gradiant 433.8 (H) 0.0 - 300.0 mmHg    CO2 Content 27.4 22 - 33 mmol/L    Temperature 0.0 C    Barometric Pressure for Blood Gas 730 mmHg    Modality HFNC     FIO2 80 %    Flow Rate 15.0 lpm    Ventilator Mode NA     Note      Collected by [id number]     pH, Temp Corrected      pCO2, Temperature Corrected      pO2, Temperature Corrected     Lactic Acid, Reflex Timer (This will reflex a repeat order 3-3:15 hours after ordered.)   Result Value Ref Range    Hold Tube Hold for add-ons.    Manual Differential   Result Value Ref Range    Neutrophil % 76.0 42.7 - 76.0 %    Lymphocyte % 2.0 (L) 19.6 - 45.3 %    Monocyte % 3.0 (L) 5.0 - 12.0 %    Bands %  16.0 (H) 0.0 - 5.0 %    Metamyelocyte % 3.0 (H) 0.0 - 0.0 %    Neutrophils Absolute 54.71 (H) 1.70 - 7.00 10*3/mm3    Lymphocytes Absolute 1.19 0.70 - 3.10 10*3/mm3    Monocytes Absolute 1.78 (H) 0.10 - 0.90 10*3/mm3    RBC Morphology Normal Normal    Toxic Granulation Slight/1+ None Seen    Vacuolated Neutrophils Slight/1+ None Seen    Platelet Morphology Normal Normal   Blood Gas, Arterial With Co-Ox   Result Value Ref Range    Site Left Brachial     Prakash's Test N/A     pH, Arterial 7.369 7.350 - 7.450 pH units    pCO2, Arterial 43.4 35.0 - 45.0 mm Hg    pO2, Arterial 58.6 (L) 83.0 - 108.0 mm Hg    HCO3, Arterial 25.0 20.0 - 26.0 mmol/L    Base Excess, Arterial -0.4 (L) 0.0 - 2.0 mmol/L    O2 Saturation, Arterial 88.5 (L) 94.0 - 99.0 %     Hemoglobin, Blood Gas 12.4 (L) 14 - 18 g/dL    Hematocrit, Blood Gas 38.1 38.0 - 51.0 %    Oxyhemoglobin 87.3 (L) 94 - 99 %    Methemoglobin 0.30 0.00 - 3.00 %    Carboxyhemoglobin 1.1 0 - 5 %    A-a Gradiant 441.9 (H) 0.0 - 300.0 mmHg    CO2 Content 26.4 22 - 33 mmol/L    Temperature 0.0 C    Barometric Pressure for Blood Gas 729 mmHg    Modality HFNC     FIO2 80 %    Flow Rate 15.0 lpm    Ventilator Mode NA     Note      Collected by [id number]     pH, Temp Corrected      pCO2, Temperature Corrected      pO2, Temperature Corrected     Type & Screen   Result Value Ref Range    ABO Type A     RH type Positive     Antibody Screen Negative     T&S Expiration Date 4/25/2020 11:59:59 PM    Light Blue Top   Result Value Ref Range    Extra Tube hold for add-on    Green Top (Gel)   Result Value Ref Range    Extra Tube Hold for add-ons.    Lavender Top   Result Value Ref Range    Extra Tube hold for add-on    Gold Top - SST   Result Value Ref Range    Extra Tube Hold for add-ons.                 ED Course  ED Course as of Apr 22 1709   Wed Apr 22, 2020   1415 First blood gas for some reason has not crossed over on the computer.  Upon arrival here, on room air the pH was 7.40, PCO2 40.4, PO2 32.1, bicarb 25.  O2 saturation on this gas was 57.6%.    [CM]   1443 Patient remains awake and alert, able to speak in complete sentences.  He is tachypneic but does not appear fatigued.  He is on bubble high flow at 15 L and maintaining an O2 saturation for the most part of 92%, ranging from 90 to 94%.  I had discussed the case with Dr. Avlies and Dr. Menendez, with an initial plan to admit the patient to our ICU.  However, we do not have an ICU bed available, and there are no movable patients, so I will have to transfer the patient to another facility.  Patient tells me that if possible he would prefer to go to [place] rather than farther away.  We are calling [facility].    [CM]   4443 I discussed the case with Dr. Stewart, hospitalist at  [facility].  She consulted with her pulmonologist, call me back, accepts the patient in transfer.  They do want the patient intubated before transport.  Patient agrees with this.  We are awaiting bed assignment from [facility].    [CM]      ED Course User Index  [CM] Juanjo Mayorga MD                                           MDM  Number of Diagnoses or Management Options  Acute respiratory failure with hypoxia (CMS/HCC):   Pneumonia of both lungs due to infectious organism, unspecified part of lung:   Critical Care  Total time providing critical care: 30-74 minutes (30)      Final diagnoses:   Acute respiratory failure with hypoxia (CMS/HCC)   Pneumonia of both lungs due to infectious organism, unspecified part of lung             Please note that portions of this note were completed with a voice recognition program.        Juanjo Mayorga MD  04/22/20 6232

## 2020-04-22 NOTE — ED NOTES
Pt given cell phone to speak to spouse and family. I also spoke to spouse and family and provided update on plan of care. Eunice syed, pt spouse, can be reached at [phone].      Ksenia Hsu, RN  04/22/20 1600

## 2020-04-22 NOTE — ED NOTES
I called [facility] for pt transfer als to [facility], I spoke with lisandra and he is going to call me back if able to take the pt.      Malina Pinto  04/22/20 4981

## 2020-04-22 NOTE — ED NOTES
Pt remains in droplet/contact precautions at time of transfer.     Ksenia Hsu, RN  04/22/20 5320

## 2020-04-22 NOTE — ED NOTES
pts room assignment is 2 [facility] 211 number to call report to is [phone]     Malina Pinto  04/22/20 2326     Unknown if ever smoked

## 2020-04-22 NOTE — ED NOTES
"Called pt daughter in law lev syed and provided her an update on pt condition and plan of care. Lev reports that she will call pts spouse and inform her of status due to pt spouse being \"rattled and upset\".      Ksenia Hsu RN  04/22/20 2375    "

## 2020-04-22 NOTE — ED NOTES
Pt remains ventilated and intubated via 8 ett tube secured at 26cm at right lip. Port to right chest wall remains accessed with 20 gauge, ns infusing at 125ml/hr and diprivan infusing at 45mcg/kg/min, no s/s of infiltration. 18 gauge in left ac remains patent with ns infusing wide open, 500ml, and versed infusing at 1mg/hr, no s/s of infiltration. 16 fr frey remains patent with approx 250ml of handy urine emptied at this time. Ns noted. Pt waiting on ems for transfer to [facility]. zoran co pca form completed, copy placed in chart.       Ksenia Hsu, RN  04/22/20 1503

## 2020-04-22 NOTE — ED NOTES
Pt beginning to try and wake up and open eyes, provider made aware, new orders noted.      Ksenia Hsu, RN  04/22/20 3572

## 2020-04-22 NOTE — ED NOTES
Pt to be transferred to [facility] 2 east bed 2116. Phone [phone].     Ksenia Hsu RN  04/22/20 0612

## 2020-05-11 ENCOUNTER — APPOINTMENT (OUTPATIENT)
Dept: ONCOLOGY | Facility: HOSPITAL | Age: 75
End: 2020-05-11

## 2024-01-03 NOTE — ED NOTES
Dr Mayorga on the phone with Dr Stewart from [facility] at this time.      Malina Pinto  04/22/20 0291     none

## (undated) DEVICE — PAD GRND REM POLYHESIVE A/ DISP

## (undated) DEVICE — HOLDER: Brand: DEROYAL

## (undated) DEVICE — SUT PROLN 3/0 8832H

## (undated) DEVICE — INTENDED FOR TISSUE SEPARATION, AND OTHER PROCEDURES THAT REQUIRE A SHARP SURGICAL BLADE TO PUNCTURE OR CUT.: Brand: BARD-PARKER ® CARBON RIB-BACK BLADES

## (undated) DEVICE — DRAPE,T,LAPARO,TRANS,STERILE: Brand: MEDLINE

## (undated) DEVICE — GLV SURG PREMIERPRO MIC LTX PF SZ7.5 BRN

## (undated) DEVICE — DRAPE,UTILTY,TAPE,15X26, 4EA/PK: Brand: MEDLINE

## (undated) DEVICE — SKIN AFFIX SURG ADHESIVE 72/CS 0.55ML: Brand: MEDLINE

## (undated) DEVICE — DRP C/ARM W/BAND W/CLIPS 41X74IN

## (undated) DEVICE — PK BASIC 70

## (undated) DEVICE — SYR LUERLOK 30CC

## (undated) DEVICE — NDL HYPO ECLPS SFTY 18G 1 1/2IN

## (undated) DEVICE — KT CVR ULTRASND PROB PULL UP LXF 5X8

## (undated) DEVICE — SUT MNCRYL PLS ANTIB UD 4/0 PS2 18IN

## (undated) DEVICE — SUT VIC 3/0 SH 27IN J416H

## (undated) DEVICE — APPL CHLORAPREP HI/LITE 26ML ORNG

## (undated) DEVICE — NDL HYPO ECLPS SFTY 22G 1 1/2IN